# Patient Record
Sex: FEMALE | Race: WHITE
[De-identification: names, ages, dates, MRNs, and addresses within clinical notes are randomized per-mention and may not be internally consistent; named-entity substitution may affect disease eponyms.]

---

## 2019-08-19 ENCOUNTER — NON-APPOINTMENT (OUTPATIENT)
Age: 69
End: 2019-08-19

## 2019-08-19 ENCOUNTER — APPOINTMENT (OUTPATIENT)
Dept: HEART AND VASCULAR | Facility: CLINIC | Age: 69
End: 2019-08-19
Payer: MEDICARE

## 2019-08-19 VITALS — HEIGHT: 63 IN | BODY MASS INDEX: 25.34 KG/M2 | WEIGHT: 143 LBS

## 2019-08-19 VITALS — DIASTOLIC BLOOD PRESSURE: 80 MMHG | SYSTOLIC BLOOD PRESSURE: 130 MMHG

## 2019-08-19 DIAGNOSIS — R09.89 OTHER SPECIFIED SYMPTOMS AND SIGNS INVOLVING THE CIRCULATORY AND RESPIRATORY SYSTEMS: ICD-10-CM

## 2019-08-19 DIAGNOSIS — I20.9 ANGINA PECTORIS, UNSPECIFIED: ICD-10-CM

## 2019-08-19 DIAGNOSIS — J44.9 CHRONIC OBSTRUCTIVE PULMONARY DISEASE, UNSPECIFIED: ICD-10-CM

## 2019-08-19 DIAGNOSIS — Z00.00 ENCOUNTER FOR GENERAL ADULT MEDICAL EXAMINATION W/OUT ABNORMAL FINDINGS: ICD-10-CM

## 2019-08-19 PROCEDURE — 93306 TTE W/DOPPLER COMPLETE: CPT

## 2019-08-19 PROCEDURE — 99204 OFFICE O/P NEW MOD 45 MIN: CPT

## 2019-08-19 PROCEDURE — 93000 ELECTROCARDIOGRAM COMPLETE: CPT

## 2019-08-19 PROCEDURE — 93880 EXTRACRANIAL BILAT STUDY: CPT

## 2019-08-19 RX ORDER — ASPIRIN 81 MG/1
81 TABLET ORAL DAILY
Qty: 90 | Refills: 0 | Status: ACTIVE | COMMUNITY
Start: 2019-08-19 | End: 1900-01-01

## 2019-08-19 RX ORDER — METOPROLOL SUCCINATE 50 MG/1
50 TABLET, EXTENDED RELEASE ORAL
Qty: 90 | Refills: 3 | Status: ACTIVE | COMMUNITY
Start: 2019-08-19 | End: 1900-01-01

## 2019-08-19 RX ORDER — UMECLIDINIUM BROMIDE AND VILANTEROL TRIFENATATE 62.5; 25 UG/1; UG/1
POWDER RESPIRATORY (INHALATION)
Refills: 0 | Status: ACTIVE | COMMUNITY

## 2019-08-19 NOTE — HISTORY OF PRESENT ILLNESS
[FreeTextEntry1] : 69-year-old white female who is an active smoker with COPD who presents with a one-month history of progressive symptoms of marked dyspnea with minimal activity associated with midsternal chest pain lasting 2-3 minutes relieved with rest patient currently is only taking aspirin and uses a inhaler periodically. Patient denies any E. edema syncope palpitations.

## 2019-08-19 NOTE — PHYSICAL EXAM
[General Appearance - Well Developed] : well developed [Normal Appearance] : normal appearance [Well Groomed] : well groomed [General Appearance - Well Nourished] : well nourished [No Deformities] : no deformities [General Appearance - In No Acute Distress] : no acute distress [Normal Conjunctiva] : the conjunctiva exhibited no abnormalities [Eyelids - No Xanthelasma] : the eyelids demonstrated no xanthelasmas [Normal Oral Mucosa] : normal oral mucosa [No Oral Cyanosis] : no oral cyanosis [No Oral Pallor] : no oral pallor [Normal Jugular Venous V Waves Present] : normal jugular venous V waves present [Normal Jugular Venous A Waves Present] : normal jugular venous A waves present [No Jugular Venous James A Waves] : no jugular venous james A waves [II] : a grade 2 [Normal S1] : normal S1 [Normal S2] : normal S2 [Right Carotid Bruit] : right carotid bruit heard [Left Carotid Bruit] : left carotid bruit heard [Exaggerated Use Of Accessory Muscles For Inspiration] : no accessory muscle use [Respiration, Rhythm And Depth] : normal respiratory rhythm and effort [Auscultation Breath Sounds / Voice Sounds] : lungs were clear to auscultation bilaterally [Abdomen Soft] : soft [Abdomen Tenderness] : non-tender [] : no hepato-splenomegaly [Abdomen Mass (___ Cm)] : no abdominal mass palpated [Abnormal Walk] : normal gait [Gait - Sufficient For Exercise Testing] : the gait was sufficient for exercise testing

## 2019-08-19 NOTE — REVIEW OF SYSTEMS
[Dyspnea on exertion] : dyspnea during exertion [Shortness Of Breath] : shortness of breath [Chest Pain] : chest pain [Chest  Pressure] : chest pressure [Abdominal Pain] : abdominal pain [Heartburn] : heartburn [Negative] : Heme/Lymph

## 2019-08-19 NOTE — ASSESSMENT
[FreeTextEntry1] : Recent onset angina at low level of effort \par Add asa and toprol\par will recommend abgio to assess ischemic origin of pain\par Had discussion with daughter about \par angiogram . I have recommended immediate procedure and that patient not  travel to [facility] \par Echo LVEF 55% TDS MILD AI MR TR

## 2019-08-20 LAB
ALBUMIN SERPL ELPH-MCNC: 4.6 G/DL
ALP BLD-CCNC: 105 U/L
ALT SERPL-CCNC: 10 U/L
AST SERPL-CCNC: 12 U/L
BILIRUB DIRECT SERPL-MCNC: 0.1 MG/DL
BILIRUB INDIRECT SERPL-MCNC: 0.2 MG/DL
BILIRUB SERPL-MCNC: 0.4 MG/DL
CHOLEST SERPL-MCNC: 264 MG/DL
CHOLEST/HDLC SERPL: 4.3 RATIO
HDLC SERPL-MCNC: 61 MG/DL
LDLC SERPL CALC-MCNC: 177 MG/DL
PROT SERPL-MCNC: 7 G/DL
TRIGL SERPL-MCNC: 128 MG/DL

## 2019-08-28 RX ORDER — ATORVASTATIN CALCIUM 20 MG/1
20 TABLET, FILM COATED ORAL DAILY
Qty: 90 | Refills: 3 | Status: ACTIVE | COMMUNITY
Start: 2019-08-28 | End: 1900-01-01

## 2019-09-09 ENCOUNTER — APPOINTMENT (OUTPATIENT)
Dept: HEART AND VASCULAR | Facility: CLINIC | Age: 69
End: 2019-09-09

## 2019-09-23 ENCOUNTER — APPOINTMENT (OUTPATIENT)
Dept: HEART AND VASCULAR | Facility: CLINIC | Age: 69
End: 2019-09-23

## 2019-10-01 ENCOUNTER — MOBILE ON CALL (OUTPATIENT)
Age: 69
End: 2019-10-01

## 2019-10-03 ENCOUNTER — APPOINTMENT (OUTPATIENT)
Dept: HEART AND VASCULAR | Facility: CLINIC | Age: 69
End: 2019-10-03

## 2023-05-23 ENCOUNTER — EMERGENCY (EMERGENCY)
Facility: HOSPITAL | Age: 73
LOS: 0 days | Discharge: LEFT BEFORE DISCHARGE | End: 2023-05-23
Attending: EMERGENCY MEDICINE
Payer: MEDICARE

## 2023-05-23 VITALS
HEART RATE: 76 BPM | DIASTOLIC BLOOD PRESSURE: 78 MMHG | OXYGEN SATURATION: 94 % | RESPIRATION RATE: 18 BRPM | WEIGHT: 110.01 LBS | SYSTOLIC BLOOD PRESSURE: 144 MMHG | TEMPERATURE: 97 F

## 2023-05-23 VITALS
RESPIRATION RATE: 20 BRPM | DIASTOLIC BLOOD PRESSURE: 72 MMHG | SYSTOLIC BLOOD PRESSURE: 138 MMHG | HEART RATE: 72 BPM | OXYGEN SATURATION: 94 %

## 2023-05-23 DIAGNOSIS — R05.9 COUGH, UNSPECIFIED: ICD-10-CM

## 2023-05-23 DIAGNOSIS — J44.9 CHRONIC OBSTRUCTIVE PULMONARY DISEASE, UNSPECIFIED: ICD-10-CM

## 2023-05-23 DIAGNOSIS — R06.2 WHEEZING: ICD-10-CM

## 2023-05-23 DIAGNOSIS — Z53.29 PROCEDURE AND TREATMENT NOT CARRIED OUT BECAUSE OF PATIENT'S DECISION FOR OTHER REASONS: ICD-10-CM

## 2023-05-23 DIAGNOSIS — I49.1 ATRIAL PREMATURE DEPOLARIZATION: ICD-10-CM

## 2023-05-23 DIAGNOSIS — E78.5 HYPERLIPIDEMIA, UNSPECIFIED: ICD-10-CM

## 2023-05-23 DIAGNOSIS — Z88.0 ALLERGY STATUS TO PENICILLIN: ICD-10-CM

## 2023-05-23 DIAGNOSIS — I10 ESSENTIAL (PRIMARY) HYPERTENSION: ICD-10-CM

## 2023-05-23 DIAGNOSIS — R07.89 OTHER CHEST PAIN: ICD-10-CM

## 2023-05-23 DIAGNOSIS — R06.02 SHORTNESS OF BREATH: ICD-10-CM

## 2023-05-23 DIAGNOSIS — F17.200 NICOTINE DEPENDENCE, UNSPECIFIED, UNCOMPLICATED: ICD-10-CM

## 2023-05-23 LAB
ALBUMIN SERPL ELPH-MCNC: 4.3 G/DL — SIGNIFICANT CHANGE UP (ref 3.5–5.2)
ALP SERPL-CCNC: 114 U/L — SIGNIFICANT CHANGE UP (ref 30–115)
ALT FLD-CCNC: 7 U/L — SIGNIFICANT CHANGE UP (ref 0–41)
ANION GAP SERPL CALC-SCNC: 16 MMOL/L — HIGH (ref 7–14)
AST SERPL-CCNC: 13 U/L — SIGNIFICANT CHANGE UP (ref 0–41)
BASE EXCESS BLDV CALC-SCNC: 2.4 MMOL/L — SIGNIFICANT CHANGE UP (ref -2–3)
BASOPHILS # BLD AUTO: 0.03 K/UL — SIGNIFICANT CHANGE UP (ref 0–0.2)
BASOPHILS NFR BLD AUTO: 0.3 % — SIGNIFICANT CHANGE UP (ref 0–1)
BILIRUB SERPL-MCNC: 0.3 MG/DL — SIGNIFICANT CHANGE UP (ref 0.2–1.2)
BUN SERPL-MCNC: 12 MG/DL — SIGNIFICANT CHANGE UP (ref 10–20)
CA-I SERPL-SCNC: 1.22 MMOL/L — SIGNIFICANT CHANGE UP (ref 1.15–1.33)
CALCIUM SERPL-MCNC: 9.6 MG/DL — SIGNIFICANT CHANGE UP (ref 8.4–10.5)
CHLORIDE SERPL-SCNC: 104 MMOL/L — SIGNIFICANT CHANGE UP (ref 98–110)
CO2 SERPL-SCNC: 22 MMOL/L — SIGNIFICANT CHANGE UP (ref 17–32)
CREAT SERPL-MCNC: 0.7 MG/DL — SIGNIFICANT CHANGE UP (ref 0.7–1.5)
EGFR: 91 ML/MIN/1.73M2 — SIGNIFICANT CHANGE UP
EOSINOPHIL # BLD AUTO: 0.07 K/UL — SIGNIFICANT CHANGE UP (ref 0–0.7)
EOSINOPHIL NFR BLD AUTO: 0.8 % — SIGNIFICANT CHANGE UP (ref 0–8)
GAS PNL BLDV: 137 MMOL/L — SIGNIFICANT CHANGE UP (ref 136–145)
GAS PNL BLDV: SIGNIFICANT CHANGE UP
GLUCOSE SERPL-MCNC: 115 MG/DL — HIGH (ref 70–99)
HCO3 BLDV-SCNC: 28 MMOL/L — SIGNIFICANT CHANGE UP (ref 22–29)
HCT VFR BLD CALC: 44.6 % — SIGNIFICANT CHANGE UP (ref 37–47)
HCT VFR BLDA CALC: 43 % — SIGNIFICANT CHANGE UP (ref 39–51)
HGB BLD-MCNC: 14.4 G/DL — SIGNIFICANT CHANGE UP (ref 12–16)
IMM GRANULOCYTES NFR BLD AUTO: 0.3 % — SIGNIFICANT CHANGE UP (ref 0.1–0.3)
LACTATE BLDV-MCNC: 2.6 MMOL/L — HIGH (ref 0.5–2)
LYMPHOCYTES # BLD AUTO: 1.79 K/UL — SIGNIFICANT CHANGE UP (ref 1.2–3.4)
LYMPHOCYTES # BLD AUTO: 20.8 % — SIGNIFICANT CHANGE UP (ref 20.5–51.1)
MCHC RBC-ENTMCNC: 26.5 PG — LOW (ref 27–31)
MCHC RBC-ENTMCNC: 32.3 G/DL — SIGNIFICANT CHANGE UP (ref 32–37)
MCV RBC AUTO: 82 FL — SIGNIFICANT CHANGE UP (ref 81–99)
MONOCYTES # BLD AUTO: 0.41 K/UL — SIGNIFICANT CHANGE UP (ref 0.1–0.6)
MONOCYTES NFR BLD AUTO: 4.8 % — SIGNIFICANT CHANGE UP (ref 1.7–9.3)
NEUTROPHILS # BLD AUTO: 6.29 K/UL — SIGNIFICANT CHANGE UP (ref 1.4–6.5)
NEUTROPHILS NFR BLD AUTO: 73 % — SIGNIFICANT CHANGE UP (ref 42.2–75.2)
NRBC # BLD: 0 /100 WBCS — SIGNIFICANT CHANGE UP (ref 0–0)
PCO2 BLDV: 48 MMHG — HIGH (ref 39–42)
PH BLDV: 7.38 — SIGNIFICANT CHANGE UP (ref 7.32–7.43)
PLATELET # BLD AUTO: 255 K/UL — SIGNIFICANT CHANGE UP (ref 130–400)
PMV BLD: 10.1 FL — SIGNIFICANT CHANGE UP (ref 7.4–10.4)
PO2 BLDV: 25 MMHG — SIGNIFICANT CHANGE UP
POTASSIUM BLDV-SCNC: 3.5 MMOL/L — SIGNIFICANT CHANGE UP (ref 3.5–5.1)
POTASSIUM SERPL-MCNC: 4.2 MMOL/L — SIGNIFICANT CHANGE UP (ref 3.5–5)
POTASSIUM SERPL-SCNC: 4.2 MMOL/L — SIGNIFICANT CHANGE UP (ref 3.5–5)
PROT SERPL-MCNC: 6.6 G/DL — SIGNIFICANT CHANGE UP (ref 6–8)
RBC # BLD: 5.44 M/UL — HIGH (ref 4.2–5.4)
RBC # FLD: 14.2 % — SIGNIFICANT CHANGE UP (ref 11.5–14.5)
SODIUM SERPL-SCNC: 142 MMOL/L — SIGNIFICANT CHANGE UP (ref 135–146)
TROPONIN T SERPL-MCNC: <0.01 NG/ML — SIGNIFICANT CHANGE UP
WBC # BLD: 8.62 K/UL — SIGNIFICANT CHANGE UP (ref 4.8–10.8)
WBC # FLD AUTO: 8.62 K/UL — SIGNIFICANT CHANGE UP (ref 4.8–10.8)

## 2023-05-23 PROCEDURE — 71045 X-RAY EXAM CHEST 1 VIEW: CPT

## 2023-05-23 PROCEDURE — 84295 ASSAY OF SERUM SODIUM: CPT

## 2023-05-23 PROCEDURE — 85018 HEMOGLOBIN: CPT

## 2023-05-23 PROCEDURE — 85025 COMPLETE CBC W/AUTO DIFF WBC: CPT

## 2023-05-23 PROCEDURE — 82803 BLOOD GASES ANY COMBINATION: CPT

## 2023-05-23 PROCEDURE — 82330 ASSAY OF CALCIUM: CPT

## 2023-05-23 PROCEDURE — 36415 COLL VENOUS BLD VENIPUNCTURE: CPT

## 2023-05-23 PROCEDURE — 99285 EMERGENCY DEPT VISIT HI MDM: CPT | Mod: 25

## 2023-05-23 PROCEDURE — 84132 ASSAY OF SERUM POTASSIUM: CPT

## 2023-05-23 PROCEDURE — 83605 ASSAY OF LACTIC ACID: CPT

## 2023-05-23 PROCEDURE — 94640 AIRWAY INHALATION TREATMENT: CPT

## 2023-05-23 PROCEDURE — 84484 ASSAY OF TROPONIN QUANT: CPT

## 2023-05-23 PROCEDURE — 85014 HEMATOCRIT: CPT

## 2023-05-23 PROCEDURE — 93005 ELECTROCARDIOGRAM TRACING: CPT

## 2023-05-23 PROCEDURE — 99285 EMERGENCY DEPT VISIT HI MDM: CPT

## 2023-05-23 PROCEDURE — 71045 X-RAY EXAM CHEST 1 VIEW: CPT | Mod: 26

## 2023-05-23 PROCEDURE — 93010 ELECTROCARDIOGRAM REPORT: CPT

## 2023-05-23 PROCEDURE — 80053 COMPREHEN METABOLIC PANEL: CPT

## 2023-05-23 PROCEDURE — 96374 THER/PROPH/DIAG INJ IV PUSH: CPT

## 2023-05-23 RX ORDER — MAGNESIUM SULFATE 500 MG/ML
2 VIAL (ML) INJECTION ONCE
Refills: 0 | Status: COMPLETED | OUTPATIENT
Start: 2023-05-23 | End: 2023-05-23

## 2023-05-23 RX ORDER — IPRATROPIUM/ALBUTEROL SULFATE 18-103MCG
3 AEROSOL WITH ADAPTER (GRAM) INHALATION
Refills: 0 | Status: COMPLETED | OUTPATIENT
Start: 2023-05-23 | End: 2023-05-23

## 2023-05-23 RX ORDER — SODIUM CHLORIDE 9 MG/ML
1000 INJECTION, SOLUTION INTRAVENOUS ONCE
Refills: 0 | Status: COMPLETED | OUTPATIENT
Start: 2023-05-23 | End: 2023-05-23

## 2023-05-23 RX ADMIN — Medication 150 GRAM(S): at 15:05

## 2023-05-23 RX ADMIN — Medication 3 MILLILITER(S): at 14:04

## 2023-05-23 RX ADMIN — Medication 3 MILLILITER(S): at 13:50

## 2023-05-23 RX ADMIN — Medication 3 MILLILITER(S): at 13:30

## 2023-05-23 RX ADMIN — Medication 60 MILLIGRAM(S): at 13:42

## 2023-05-23 NOTE — ED ADULT NURSE NOTE - ISOLATION TYPE:
Pt arrived via [facility] from home with co chest pain times two hours. Pt was given 325 asa by ems. Pt states his chest pain has resolved.
None

## 2023-05-23 NOTE — ED PROVIDER NOTE - CLINICAL SUMMARY MEDICAL DECISION MAKING FREE TEXT BOX
72-year-old female active smoker with a history of COPD never admitted or seen in the ER not known to a pulmonologist presents with worsening shortness of breath and cough, has chest tightness, no fever, no leg pain or swelling, on exam vitals appreciated, nontoxic-appearing but with prolonged expirations and poor air entry with fine end expiratory wheeze, cor regular, abdomen soft nontender, calves nontender, no clubbing cyanosis or edema, neurologically intact, given nebs and steroids with subjective improvement however is very dyspneic on exertion however refuses admission, demonstrates capacity, will sign out against advice, will discharge on albuterol and steroids to quit smoking and follow-up with pulmonologist.  Patient and daughter counseled regarding conditions which should prompt return

## 2023-05-23 NOTE — ED PROVIDER NOTE - PHYSICAL EXAMINATION
CONSTITUTIONAL: Well-developed; well-nourished; NAD  SKIN: warm, dry, w/o rash  HEAD: NCAT  EYES: PERRLA, EOMI, no conjunctival injection  ENT: No nasal discharge; nl OP without erythema or exudates  NECK: Supple, non-tender  CARD: nl S1, S2; RRR, no MRG, no JVD  RESP: diffuse wheezing, normal respiratory effort  ABD: BS+, soft, NTND, no HSM  EXT: Normal ROM.  No clubbing, cyanosis or edema  NEURO: Alert, oriented, grossly unremarkable  PSYCH: Cooperative, appropriate

## 2023-05-23 NOTE — ED PROVIDER NOTE - NSPTACCESSSVCSAPPTDETAILS_ED_ALL_ED_FT
Patient presenting to ED with COPD exacerbation, no longer has pulmonologist, requesting pulmonologist in [facility] Patient presenting to ED with COPD exacerbation, no longer has pulmonologist, requesting pulmonologist in [facility] pl[facility] for COPD exacerbation -CD

## 2023-05-23 NOTE — ED PROVIDER NOTE - OBJECTIVE STATEMENT
She has her child patient is a 72-year-old female with past medical history of COPD not on home O2, current tobacco smoker, hypertension, hyperlipidemia presenting with 1 day of increased shortness of breath, wheezing, chest tightness.  Endorses mild cough and sputum production which is unchanged.  Denies fever, chills.  Denies chest pain.  Endorses tightness.  Denies lower extremity swelling, pain, personal or family history of blood clots.  Patient says symptoms have been mildly improved each time she uses her Anbro inhaler at home.  Patient otherwise well

## 2023-05-23 NOTE — ED PROVIDER NOTE - PATIENT PORTAL LINK FT
You can access the FollowMyHealth Patient Portal offered by [facility] by registering at the following website: [email or web address]. By joining [employer]’s FollowMyHealth portal, you will also be able to view your health information using other applications (apps) compatible with our system.

## 2023-05-23 NOTE — ED PROVIDER NOTE - WR ORDER ID 1
Pre-Conscious Sedation procedure phone call made to patient with the following instructions:    1. Arrive one hour (specify arrival time) before your scheduled appointment time. Please disregard the time given on the automated appointment reminder phone call.    2. Nothing to eat or drink 8 hours prior to your scheduled appointment time. You may take essential medications with sips of water.    3. You will need someone to drive you home.  Your  will need to accompany you into the Pain Clinic for your appointment in order to be checked-in for your procedure.  Your  is also required to stay in the Pain Clinic during your procedure and until discharge.  If your  is not physically in clinic, the doctor has the option to cancel your sedation, or have you reschedule when a  is available.  It is strongly recommended that someone stay with you for the day.    - Patient had no questions about  requirements and verbalized understanding of  requirements (Yes / No)   - Patient had questions about  requirements.  Questions were answered and patient verbalized understanding about  requirements (Yes / No) :     Name of  :  Message left     Contact Number:       4. If there are any questions regarding diabetic medications please call your primary doctor.    5. If you are on blood thinners: did you stop taking them as instructed?      As a reminder, with having conscious sedation for your short procedure, the goal is to reduce pain, anxiety, and stressful memories of the procedure.  Conscious sedation can also help your healthcare provider to treat you and help make you comfortable.  These medications may make you sleepy, but arousable to answer questions during the procedure.  This is not the same as General Anesthesia, where you are put in a deep sleep for surgery and may stay overnight in the hospital.  
[id number]

## 2023-05-23 NOTE — ED ADULT NURSE NOTE - HOW OFTEN DO YOU HAVE SIX OR MORE DRINKS ON ONE OCCASION?
Never You can access the FollowMyHealth Patient Portal offered by [facility] by registering at the following website: [email or web address]. By joining [employer]’s FollowMyHealth portal, you will also be able to view your health information using other applications (apps) compatible with our system.

## 2023-05-23 NOTE — ED PROVIDER NOTE - NSFOLLOWUPINSTRUCTIONS_ED_ALL_ED_FT
Our Emergency Department Referral Coordinators will be reaching out to you in the next 24-48 hours from 9:00am to 5:00pm with a follow up appointment. Please expect a phone call from the hospital in that time frame. If you do not receive a call or if you have any questions or concerns, you can reach them at   [phone]      Chronic Obstructive Pulmonary Disease     Chronic obstructive pulmonary disease (COPD) is a common lung condition in which airflow from the lungs is limited. Causes include smoking, secondhand smoke exposure, genetics, or recurrent infections. Symptoms include shortness of breath, coughing, wheezing, rapid breathing, fatigue, chest tightness, or frequent infections. Take all medicines (inhaled or pills) as directed by your health care provider. Avoid exposure to irritants such as smoke, chemicals, and fumes that aggravate your breathing.    If you are a smoker, the most important thing that you can do is stop smoking. Continuing to smoke will cause further lung damage and breathing trouble. Ask your health care provider for help with quitting smoking. He or she can direct you to community resources or hospitals that provide support.    SEEK IMMEDIATE MEDICAL CARE IF YOU HAVE THE FOLLOWING SYMPTOMS: shortness of breath at rest or when talking, bluish discoloration of lips, skin, fever, worsening cough, unexplained chest pain, or lightheadedness/dizziness.

## 2023-05-23 NOTE — ED PROVIDER NOTE - PROGRESS NOTE DETAILS
Patient states she is feeling much better, improved air movement, says chest tightness has resolved, decreased wheezing, patient marched in place for over 1 minutes with pulse oximeter in place, oxygen did not drop lower than 91%, will DC with strict return precautions, rapid pulmonary follow-up, steroids sent to pharmacy -CD Patient states she is feeling much better, requesting to leave, says chest tightness has resolved. On exam, decreased wheezing, but PT remains with increased WOB upon ambulation; recommend admission for further management; PT requesting to leave AMA despite repeated attempts by provider to convince PT to stay; PT verbilized understanding of risks including death, still requesting to leave -CD

## 2023-05-23 NOTE — ED ADULT NURSE NOTE - NSFALLHARMRISKINTERV_ED_ALL_ED

## 2023-06-06 ENCOUNTER — APPOINTMENT (OUTPATIENT)
Dept: PULMONOLOGY | Facility: CLINIC | Age: 73
End: 2023-06-06

## 2023-08-03 ENCOUNTER — APPOINTMENT (OUTPATIENT)
Dept: NEUROLOGY | Facility: CLINIC | Age: 73
End: 2023-08-03
Payer: MEDICARE

## 2023-08-03 VITALS
HEART RATE: 115 BPM | WEIGHT: 135 LBS | DIASTOLIC BLOOD PRESSURE: 91 MMHG | HEIGHT: 64 IN | SYSTOLIC BLOOD PRESSURE: 148 MMHG | BODY MASS INDEX: 23.05 KG/M2

## 2023-08-03 DIAGNOSIS — Z82.49 FAMILY HISTORY OF ISCHEMIC HEART DISEASE AND OTHER DISEASES OF THE CIRCULATORY SYSTEM: ICD-10-CM

## 2023-08-03 DIAGNOSIS — Z86.39 PERSONAL HISTORY OF OTHER ENDOCRINE, NUTRITIONAL AND METABOLIC DISEASE: ICD-10-CM

## 2023-08-03 DIAGNOSIS — Z81.8 FAMILY HISTORY OF OTHER MENTAL AND BEHAVIORAL DISORDERS: ICD-10-CM

## 2023-08-03 DIAGNOSIS — Z83.3 FAMILY HISTORY OF DIABETES MELLITUS: ICD-10-CM

## 2023-08-03 DIAGNOSIS — Z86.79 PERSONAL HISTORY OF OTHER DISEASES OF THE CIRCULATORY SYSTEM: ICD-10-CM

## 2023-08-03 PROCEDURE — 99203 OFFICE O/P NEW LOW 30 MIN: CPT

## 2023-08-03 RX ORDER — ESOMEPRAZOLE MAGNESIUM 10 MG/1
10 GRANULE, DELAYED RELEASE ORAL
Refills: 0 | Status: ACTIVE | COMMUNITY

## 2023-08-03 RX ORDER — ATORVASTATIN CALCIUM 10 MG/1
10 TABLET, FILM COATED ORAL
Refills: 0 | Status: ACTIVE | COMMUNITY

## 2023-08-03 RX ORDER — LISINOPRIL 10 MG/1
10 TABLET ORAL
Refills: 0 | Status: ACTIVE | COMMUNITY

## 2023-08-03 NOTE — HISTORY OF PRESENT ILLNESS
[FreeTextEntry1] : Ms. Genao is a 73-year-old woman who presents today in neurologic consultation accompanied by her daughter. Since the [employer] of Covid in 2020, patient has had increasing severity of memory loss and forgetfulness. This worsened over the past 6 months. She has forgotten how to cook and forgets what she's looking for in the house. She forgets what she's been told and repeats herself. Patient saw a neurologist in 2020 and she had a MMSE and MRI of the brain. Apparently, the results at that time were much better than today's evaluation according to her daughter. The patient was not told she had dementia at the time at her last neurologic evaluation, but we do not have those reports.

## 2023-08-03 NOTE — ASSESSMENT
[FreeTextEntry1] : Impression is that of dementia. The type of dementia is uncertain. It may be a multi-infarct type with her hypertension and elevated cholesterol. I ordered an MRI of the brain to r/o a vascular origin. I also asked for the reports from her work up in 2020 for my review for comparison, and the daughter will look for them. She did say her mom did much better then than today. We will see the patient back in follow up when imaging is complete.  Total clinician time spent today on the patient is 35 minutes including preparing to see the patient, obtaining and/or reviewing and confirming history, performing medically necessary and appropriate examination, counseling and educating the patient and/or family, documenting clinical information in the EHR and communicating and/or referring to other healthcare professionals.  Entered by Corinna Perez acting as scribe for Dr. Valladares.   The documentation recorded by the scribe, in my presence, accurately reflects the service I personally performed, and the decisions made by me with my edits as appropriate.  Santos Valladares MD, FAAN, FACP Diplomate American Board of Psychiatry & Neurology

## 2023-08-03 NOTE — REVIEW OF SYSTEMS
[Memory Lapses or Loss] : memory loss [Changed Thought Patterns] : changed thought patterns [Repeating Questions] : repeated questioning about recent events

## 2023-08-03 NOTE — PHYSICAL EXAM
[FreeTextEntry1] : PHYSICAL EXAMINATION: Head: Normocephalic, atraumatic. Negative TA tenderness/prominence.   Neck: Supple with full range of motion; nontender with negative bilateral Spurling's signs.   Spine: Full range of motion; nontender. Negative straight leg raise maneuvers.   Extremities: Non-tender. Atraumatic. Negative Tinel's signs.   NEUROLOGICAL EXAMINATION:   Mental Status: Patient is not able to complete the tasks. She does not speak English well and daughter had to interpret into [place]. Patient could not tell me the year, date, day, or the month. She could not tell me the city we were in or the state. She was able to tell me the floor we were on. She could not remember 3 objects after 10 minutes. Patient could not spell "world" forward or backward. She was not able to follow a 3 stage command even when giving her the instructions in [place]. We did not finish due to lack of function she demonstrated.   Cranial Nerves Cranial Nerves:   II, III, IV, VI: Pupils are equal, round, and reactive to light and accommodation. No evidence of afferent pupillary defect. Visual fields are full to confrontation. Eye movements are full without evidence of nystagmus or internuclear ophthalmoplegia. Funduscopic examination reveals sharp disc margins.   V: Normal jaw movements. Normal facial sensation.   VII: Normal facial motor testing.   VIII: Grossly normal hearing bilaterally.   IX, X: Palate moves symmetrically. No dysarthria.   XI: Normal shoulder shrug and sternocleidomastoid power.   XII: Tongue appears normal and protrudes in the midline.   Motor: Normal bulk, tone, and power throughout for age.   Muscle Stretch Reflexes (right/left): 2+ symmetrical.   Plantar Responses: Flexor bilaterally.   Coordination: Normal finger to nose and heel to shin testing, no truncal ataxia and no tremor.   Sensation: Normal primary sensation. Normal double simultaneous stimulation.   Gait and Station: Patient ambulates independently. She did not need to use a cane. Patient's posture is stooped and kyphotic with tendency to shuffle.

## 2023-08-18 ENCOUNTER — APPOINTMENT (OUTPATIENT)
Dept: MRI IMAGING | Facility: CLINIC | Age: 73
End: 2023-08-18
Payer: MEDICARE

## 2023-08-18 PROCEDURE — 70551 MRI BRAIN STEM W/O DYE: CPT

## 2023-08-31 ENCOUNTER — APPOINTMENT (OUTPATIENT)
Dept: NEUROLOGY | Facility: CLINIC | Age: 73
End: 2023-08-31
Payer: MEDICARE

## 2023-08-31 DIAGNOSIS — F03.90 UNSPECIFIED DEMENTIA W/OUT BEHAVIORAL DISTURBANCE: ICD-10-CM

## 2023-08-31 DIAGNOSIS — F17.200 NICOTINE DEPENDENCE, UNSPECIFIED, UNCOMPLICATED: ICD-10-CM

## 2023-08-31 DIAGNOSIS — G93.89 OTHER SPECIFIED DISORDERS OF BRAIN: ICD-10-CM

## 2023-08-31 DIAGNOSIS — G31.9 DEGENERATIVE DISEASE OF NERVOUS SYSTEM, UNSPECIFIED: ICD-10-CM

## 2023-08-31 DIAGNOSIS — I69.319 UNSPECIFIED SYMPTOMS AND SIGNS INVOLVING COGNITIVE FUNCTIONS FOLLOWING CEREBRAL INFARCTION: ICD-10-CM

## 2023-08-31 PROCEDURE — 99215 OFFICE O/P EST HI 40 MIN: CPT

## 2023-08-31 NOTE — ASSESSMENT
[FreeTextEntry1] : 73 year old female under our care for cerebrovascular disease, chronic ischemic infarcts, and dementia. Today we reviewed her MRI of the brain and discussed symptoms of NPH. Patient and daughter agreed to f/u with her cardiologist given her hx of ischemic stroke, smoking status and comorbidities. Patient and family do not wish to initiate medical treatment for her dementia and would like to follow up in 4-6 months. Patient and family are aware that they can call contact the office at any time if they have any additional questions or concerns regarding her management.   I have personally reviewed with the PA, this patients history and physical exam findings, as documented above. I have discussed the relevant areas of concern, having direct implications to the presenting problems and illnesses, and have personally examined all pertinent findings which impact on the prior neurological treatment.  Cintia Perdomo MS, PAFRANCOISE Valladares MD

## 2023-08-31 NOTE — REASON FOR VISIT
[Follow-Up: _____] : a [unfilled] follow-up visit [Initial Evaluation] : an initial evaluation [FreeTextEntry1] : Memory loss

## 2023-08-31 NOTE — PHYSICAL EXAM
[FreeTextEntry1] : PHYSICAL EXAMINATION: Head: Normocephalic, atraumatic. Negative TA tenderness/prominence.   Neck: Supple with full range of motion; nontender with negative bilateral Spurling's signs.   Spine: Full range of motion; nontender. Negative straight leg raise maneuvers.   Extremities: Non-tender. Atraumatic. Negative Tinel's signs.   NEUROLOGICAL EXAMINATION:   Mental Status: Patient is not able to complete the tasks. She does not speak English well and daughter had to interpret into [place]. Patient could not tell me the year, date, day, or the month. She could not tell me the city we were in or the state. She was able to tell me the floor we were on. She could not remember 3 objects after 10 minutes. Patient could not spell "world" forward or backward. She was not able to follow a 3 stage command even when giving her the instructions in [place]. We did not finish due to lack of function she demonstrated.   Cranial Nerves Cranial Nerves:   II, III, IV, VI: Pupils are equal, round, and reactive to light and accommodation. No evidence of afferent pupillary defect. Visual fields are full to confrontation. Eye movements are full without evidence of nystagmus or internuclear ophthalmoplegia. Funduscopic examination reveals sharp disc margins.   V: Normal jaw movements. Normal facial sensation.   VII: Normal facial motor testing.   VIII: Grossly normal hearing bilaterally.   IX, X: Palate moves symmetrically. No dysarthria.   XI: Normal shoulder shrug and sternocleidomastoid power.   XII: Tongue appears normal and protrudes in the midline.   Motor: Normal bulk, tone, and power throughout for age.   Muscle Stretch Reflexes (right/left): 2+ symmetrical.   Plantar Responses: Flexor bilaterally.   Coordination: Normal finger to nose and heel to shin testing, no truncal ataxia and no tremor.   Sensation: Normal primary sensation. Normal double simultaneous stimulation.   Gait and Station: Patient ambulates independently. She did not need to use a cane. Patient's posture is stooped and kyphotic with tendency to shuffle. [General Appearance - In No Acute Distress] : in no acute distress [Person] : oriented to person [Place] : disoriented to place [Time] : disoriented to time [Short Term Intact] : short term memory impaired [Remote Intact] : remote memory impaired [Concentration Intact] : a decrease in concentrating ability was observed [Visual Intact] : visual attention was ~T not ~L decreased [Naming Objects] : no difficulty naming common objects [Repeating Phrases] : no difficulty repeating a phrase [Writing A Sentence] : no difficulty writing a sentence [Fluency] : fluency intact [Comprehension] : comprehension not intact [Reading] : reading intact [Current Events] : inadequate knowledge of current events [Past History] : adequate knowledge of personal past history [Cranial Nerves Optic (II)] : visual acuity intact bilaterally,  visual fields full to confrontation, pupils equal round and reactive to light [Cranial Nerves Oculomotor (III)] : extraocular motion intact [Cranial Nerves Trigeminal (V)] : facial sensation intact symmetrically [Cranial Nerves Facial (VII)] : face symmetrical [Cranial Nerves Vestibulocochlear (VIII)] : hearing was intact bilaterally [Cranial Nerves Glossopharyngeal (IX)] : tongue and palate midline [Cranial Nerves Accessory (XI - Cranial And Spinal)] : head turning and shoulder shrug symmetric [Cranial Nerves Hypoglossal (XII)] : there was no tongue deviation with protrusion [Motor Tone] : muscle tone was normal in all four extremities [Motor Strength] : muscle strength was normal in all four extremities [No Muscle Atrophy] : normal bulk in all four extremities [Motor Strength Upper Extremities Bilaterally] : strength was normal in both upper extremities [Motor Strength Lower Extremities Bilaterally] : strength was normal in both lower extremities [Sensation Tactile Decrease] : light touch was intact [Balance] : balance was intact [Past-pointing] : there was no past-pointing [Tremor] : no tremor present [2+] : Ankle jerk left 2+ [Plantar Reflex Right Only] : normal on the right [Plantar Reflex Left Only] : normal on the left [Extraocular Movements] : extraocular movements were intact [Hearing Threshold Finger Rub Not [place]] : hearing was normal [Neck Appearance] : the appearance of the neck was normal [No Spinal Tenderness] : no spinal tenderness [Abnormal Walk] : normal gait

## 2023-08-31 NOTE — HISTORY OF PRESENT ILLNESS
[FreeTextEntry1] : Ms. Genao is a 73-year-old woman who presents today in neurologic consultation accompanied by her daughter. Since the [employer] of Covid in 2020, patient has had increasing severity of memory loss and forgetfulness. This worsened over the past 6 months. She has forgotten how to cook and forgets what she's looking for in the house. She forgets what she's been told and repeats herself. Patient saw a neurologist in 2020 and she had a MMSE and MRI of the brain. Apparently, the results at that time were much better than today's evaluation according to her daughter. The patient was not told she had dementia at the time at her last neurologic evaluation, but we do not have those reports.   Today : Today I had the pleasure of seeing Ms. Genao accompanied by her daughter and grandchildren  in our office for follow up.  The patients previous history and physical findings have been reviewed.  Ms. Genao remains under our care for cerebrovascular disease, chronic ischemic infarcts, and dementia. Today we reviewed her MRI of the brain noted above and discussed mixed-type dementia. Ms Genao is also diagnosed with COPD, is a current smoker, and was told by her cardiologist that she has "non operable heart blockages". I advised smoking cessation and close f/u with her cardiologist given her history of CVA. Patient daughter states she was once prescribed medication for her dementia by her prior neurologist but does not wish to re-start treatment. Regarding the potential NPH noted on MRI, patient daughter denies ataxia or frequent falls and denies urinary incontinence or retention. On exam Ms. Genao is Aox1 not oriented to year, place or situation and is highly dependent on her daughter for daily activities.

## 2023-10-12 PROBLEM — J44.1 CHRONIC OBSTRUCTIVE PULMONARY DISEASE WITH (ACUTE) EXACERBATION: Chronic | Status: ACTIVE | Noted: 2023-05-23

## 2024-02-15 ENCOUNTER — APPOINTMENT (OUTPATIENT)
Dept: NEUROLOGY | Facility: CLINIC | Age: 74
End: 2024-02-15

## 2024-02-16 ENCOUNTER — APPOINTMENT (OUTPATIENT)
Dept: NEUROLOGY | Facility: CLINIC | Age: 74
End: 2024-02-16

## 2024-04-29 ENCOUNTER — INPATIENT (INPATIENT)
Facility: HOSPITAL | Age: 74
LOS: 1 days | Discharge: HOME HEALTH | DRG: 192 | End: 2024-05-01
Attending: INTERNAL MEDICINE | Admitting: INTERNAL MEDICINE
Payer: MEDICARE

## 2024-04-29 VITALS
OXYGEN SATURATION: 96 % | DIASTOLIC BLOOD PRESSURE: 103 MMHG | RESPIRATION RATE: 22 BRPM | HEART RATE: 134 BPM | SYSTOLIC BLOOD PRESSURE: 175 MMHG

## 2024-04-29 LAB
ALBUMIN SERPL ELPH-MCNC: 4 G/DL — SIGNIFICANT CHANGE UP (ref 3.5–5.2)
ALP SERPL-CCNC: 110 U/L — SIGNIFICANT CHANGE UP (ref 30–115)
ALT FLD-CCNC: 6 U/L — SIGNIFICANT CHANGE UP (ref 0–41)
ANION GAP SERPL CALC-SCNC: 9 MMOL/L — SIGNIFICANT CHANGE UP (ref 7–14)
AST SERPL-CCNC: 10 U/L — SIGNIFICANT CHANGE UP (ref 0–41)
BASE EXCESS BLDV CALC-SCNC: 4.8 MMOL/L — HIGH (ref -2–3)
BASOPHILS # BLD AUTO: 0.03 K/UL — SIGNIFICANT CHANGE UP (ref 0–0.2)
BASOPHILS NFR BLD AUTO: 0.2 % — SIGNIFICANT CHANGE UP (ref 0–1)
BILIRUB SERPL-MCNC: 0.3 MG/DL — SIGNIFICANT CHANGE UP (ref 0.2–1.2)
BUN SERPL-MCNC: 21 MG/DL — HIGH (ref 10–20)
CALCIUM SERPL-MCNC: 9.3 MG/DL — SIGNIFICANT CHANGE UP (ref 8.4–10.5)
CHLORIDE SERPL-SCNC: 104 MMOL/L — SIGNIFICANT CHANGE UP (ref 98–110)
CO2 SERPL-SCNC: 28 MMOL/L — SIGNIFICANT CHANGE UP (ref 17–32)
CREAT SERPL-MCNC: 0.7 MG/DL — SIGNIFICANT CHANGE UP (ref 0.7–1.5)
D DIMER BLD IA.RAPID-MCNC: 195 NG/ML DDU — SIGNIFICANT CHANGE UP
EGFR: 90 ML/MIN/1.73M2 — SIGNIFICANT CHANGE UP
EOSINOPHIL # BLD AUTO: 0.12 K/UL — SIGNIFICANT CHANGE UP (ref 0–0.7)
EOSINOPHIL NFR BLD AUTO: 0.9 % — SIGNIFICANT CHANGE UP (ref 0–8)
GLUCOSE SERPL-MCNC: 179 MG/DL — HIGH (ref 70–99)
HCO3 BLDV-SCNC: 33 MMOL/L — HIGH (ref 22–29)
HCT VFR BLD CALC: 42.4 % — SIGNIFICANT CHANGE UP (ref 37–47)
HGB BLD-MCNC: 13.8 G/DL — SIGNIFICANT CHANGE UP (ref 12–16)
IMM GRANULOCYTES NFR BLD AUTO: 0.4 % — HIGH (ref 0.1–0.3)
LYMPHOCYTES # BLD AUTO: 1.47 K/UL — SIGNIFICANT CHANGE UP (ref 1.2–3.4)
LYMPHOCYTES # BLD AUTO: 11.1 % — LOW (ref 20.5–51.1)
MAGNESIUM SERPL-MCNC: 1.7 MG/DL — LOW (ref 1.8–2.4)
MCHC RBC-ENTMCNC: 26.8 PG — LOW (ref 27–31)
MCHC RBC-ENTMCNC: 32.5 G/DL — SIGNIFICANT CHANGE UP (ref 32–37)
MCV RBC AUTO: 82.3 FL — SIGNIFICANT CHANGE UP (ref 81–99)
MONOCYTES # BLD AUTO: 0.63 K/UL — HIGH (ref 0.1–0.6)
MONOCYTES NFR BLD AUTO: 4.7 % — SIGNIFICANT CHANGE UP (ref 1.7–9.3)
NEUTROPHILS # BLD AUTO: 10.98 K/UL — HIGH (ref 1.4–6.5)
NEUTROPHILS NFR BLD AUTO: 82.7 % — HIGH (ref 42.2–75.2)
NRBC # BLD: 0 /100 WBCS — SIGNIFICANT CHANGE UP (ref 0–0)
NT-PROBNP SERPL-SCNC: 201 PG/ML — SIGNIFICANT CHANGE UP (ref 0–300)
PCO2 BLDV: 66 MMHG — HIGH (ref 39–42)
PH BLDV: 7.31 — LOW (ref 7.32–7.43)
PLATELET # BLD AUTO: 288 K/UL — SIGNIFICANT CHANGE UP (ref 130–400)
PMV BLD: 10.1 FL — SIGNIFICANT CHANGE UP (ref 7.4–10.4)
PO2 BLDV: 29 MMHG — SIGNIFICANT CHANGE UP (ref 25–45)
POTASSIUM SERPL-MCNC: 4 MMOL/L — SIGNIFICANT CHANGE UP (ref 3.5–5)
POTASSIUM SERPL-SCNC: 4 MMOL/L — SIGNIFICANT CHANGE UP (ref 3.5–5)
PROT SERPL-MCNC: 6.4 G/DL — SIGNIFICANT CHANGE UP (ref 6–8)
RBC # BLD: 5.15 M/UL — SIGNIFICANT CHANGE UP (ref 4.2–5.4)
RBC # FLD: 13.9 % — SIGNIFICANT CHANGE UP (ref 11.5–14.5)
SAO2 % BLDV: 41.8 % — LOW (ref 67–88)
SODIUM SERPL-SCNC: 141 MMOL/L — SIGNIFICANT CHANGE UP (ref 135–146)
WBC # BLD: 13.28 K/UL — HIGH (ref 4.8–10.8)
WBC # FLD AUTO: 13.28 K/UL — HIGH (ref 4.8–10.8)

## 2024-04-29 PROCEDURE — 93010 ELECTROCARDIOGRAM REPORT: CPT

## 2024-04-29 PROCEDURE — 71045 X-RAY EXAM CHEST 1 VIEW: CPT | Mod: 26

## 2024-04-29 PROCEDURE — 99285 EMERGENCY DEPT VISIT HI MDM: CPT

## 2024-04-29 RX ORDER — IPRATROPIUM/ALBUTEROL SULFATE 18-103MCG
3 AEROSOL WITH ADAPTER (GRAM) INHALATION
Refills: 0 | Status: COMPLETED | OUTPATIENT
Start: 2024-04-29 | End: 2024-04-29

## 2024-04-29 RX ADMIN — Medication 125 MILLIGRAM(S): at 23:19

## 2024-04-29 RX ADMIN — Medication 3 MILLILITER(S): at 23:21

## 2024-04-30 ENCOUNTER — RESULT REVIEW (OUTPATIENT)
Age: 74
End: 2024-04-30

## 2024-04-30 DIAGNOSIS — J44.1 CHRONIC OBSTRUCTIVE PULMONARY DISEASE WITH (ACUTE) EXACERBATION: ICD-10-CM

## 2024-04-30 LAB
ALBUMIN SERPL ELPH-MCNC: 3.9 G/DL — SIGNIFICANT CHANGE UP (ref 3.5–5.2)
ALP SERPL-CCNC: 113 U/L — SIGNIFICANT CHANGE UP (ref 30–115)
ALT FLD-CCNC: 6 U/L — SIGNIFICANT CHANGE UP (ref 0–41)
ANION GAP SERPL CALC-SCNC: 14 MMOL/L — SIGNIFICANT CHANGE UP (ref 7–14)
AST SERPL-CCNC: 10 U/L — SIGNIFICANT CHANGE UP (ref 0–41)
BASOPHILS # BLD AUTO: 0 K/UL — SIGNIFICANT CHANGE UP (ref 0–0.2)
BASOPHILS NFR BLD AUTO: 0 % — SIGNIFICANT CHANGE UP (ref 0–1)
BILIRUB SERPL-MCNC: 0.2 MG/DL — SIGNIFICANT CHANGE UP (ref 0.2–1.2)
BUN SERPL-MCNC: 19 MG/DL — SIGNIFICANT CHANGE UP (ref 10–20)
CALCIUM SERPL-MCNC: 9.2 MG/DL — SIGNIFICANT CHANGE UP (ref 8.4–10.5)
CHLORIDE SERPL-SCNC: 103 MMOL/L — SIGNIFICANT CHANGE UP (ref 98–110)
CHOLEST SERPL-MCNC: 144 MG/DL — SIGNIFICANT CHANGE UP
CO2 SERPL-SCNC: 23 MMOL/L — SIGNIFICANT CHANGE UP (ref 17–32)
CREAT SERPL-MCNC: 0.7 MG/DL — SIGNIFICANT CHANGE UP (ref 0.7–1.5)
EGFR: 90 ML/MIN/1.73M2 — SIGNIFICANT CHANGE UP
EOSINOPHIL # BLD AUTO: 0.01 K/UL — SIGNIFICANT CHANGE UP (ref 0–0.7)
EOSINOPHIL NFR BLD AUTO: 0.1 % — SIGNIFICANT CHANGE UP (ref 0–8)
GLUCOSE SERPL-MCNC: 165 MG/DL — HIGH (ref 70–99)
HCT VFR BLD CALC: 39.2 % — SIGNIFICANT CHANGE UP (ref 37–47)
HDLC SERPL-MCNC: 58 MG/DL — SIGNIFICANT CHANGE UP
HGB BLD-MCNC: 13.1 G/DL — SIGNIFICANT CHANGE UP (ref 12–16)
IMM GRANULOCYTES NFR BLD AUTO: 0.2 % — SIGNIFICANT CHANGE UP (ref 0.1–0.3)
LIPID PNL WITH DIRECT LDL SERPL: 77 MG/DL — SIGNIFICANT CHANGE UP
LYMPHOCYTES # BLD AUTO: 0.53 K/UL — LOW (ref 1.2–3.4)
LYMPHOCYTES # BLD AUTO: 5.4 % — LOW (ref 20.5–51.1)
MAGNESIUM SERPL-MCNC: 1.8 MG/DL — SIGNIFICANT CHANGE UP (ref 1.8–2.4)
MCHC RBC-ENTMCNC: 27.9 PG — SIGNIFICANT CHANGE UP (ref 27–31)
MCHC RBC-ENTMCNC: 33.4 G/DL — SIGNIFICANT CHANGE UP (ref 32–37)
MCV RBC AUTO: 83.4 FL — SIGNIFICANT CHANGE UP (ref 81–99)
MONOCYTES # BLD AUTO: 0.09 K/UL — LOW (ref 0.1–0.6)
MONOCYTES NFR BLD AUTO: 0.9 % — LOW (ref 1.7–9.3)
NEUTROPHILS # BLD AUTO: 9.22 K/UL — HIGH (ref 1.4–6.5)
NEUTROPHILS NFR BLD AUTO: 93.4 % — HIGH (ref 42.2–75.2)
NON HDL CHOLESTEROL: 86 MG/DL — SIGNIFICANT CHANGE UP
NRBC # BLD: 0 /100 WBCS — SIGNIFICANT CHANGE UP (ref 0–0)
PHOSPHATE SERPL-MCNC: 3 MG/DL — SIGNIFICANT CHANGE UP (ref 2.1–4.9)
PLATELET # BLD AUTO: 307 K/UL — SIGNIFICANT CHANGE UP (ref 130–400)
PMV BLD: 10.6 FL — HIGH (ref 7.4–10.4)
POTASSIUM SERPL-MCNC: 3.7 MMOL/L — SIGNIFICANT CHANGE UP (ref 3.5–5)
POTASSIUM SERPL-SCNC: 3.7 MMOL/L — SIGNIFICANT CHANGE UP (ref 3.5–5)
PROT SERPL-MCNC: 6.3 G/DL — SIGNIFICANT CHANGE UP (ref 6–8)
RBC # BLD: 4.7 M/UL — SIGNIFICANT CHANGE UP (ref 4.2–5.4)
RBC # FLD: 14 % — SIGNIFICANT CHANGE UP (ref 11.5–14.5)
SODIUM SERPL-SCNC: 140 MMOL/L — SIGNIFICANT CHANGE UP (ref 135–146)
TRIGL SERPL-MCNC: 44 MG/DL — SIGNIFICANT CHANGE UP
TROPONIN T, HIGH SENSITIVITY RESULT: 56 NG/L — CRITICAL HIGH (ref 6–13)
TROPONIN T, HIGH SENSITIVITY RESULT: 83 NG/L — CRITICAL HIGH (ref 6–13)
TROPONIN T, HIGH SENSITIVITY RESULT: 99 NG/L — CRITICAL HIGH (ref 6–13)
WBC # BLD: 9.87 K/UL — SIGNIFICANT CHANGE UP (ref 4.8–10.8)
WBC # FLD AUTO: 9.87 K/UL — SIGNIFICANT CHANGE UP (ref 4.8–10.8)

## 2024-04-30 PROCEDURE — 99291 CRITICAL CARE FIRST HOUR: CPT

## 2024-04-30 PROCEDURE — 93306 TTE W/DOPPLER COMPLETE: CPT | Mod: 26

## 2024-04-30 PROCEDURE — 99233 SBSQ HOSP IP/OBS HIGH 50: CPT

## 2024-04-30 PROCEDURE — 80061 LIPID PANEL: CPT

## 2024-04-30 PROCEDURE — 84145 PROCALCITONIN (PCT): CPT

## 2024-04-30 PROCEDURE — 36415 COLL VENOUS BLD VENIPUNCTURE: CPT

## 2024-04-30 PROCEDURE — 83735 ASSAY OF MAGNESIUM: CPT

## 2024-04-30 PROCEDURE — 99223 1ST HOSP IP/OBS HIGH 75: CPT

## 2024-04-30 PROCEDURE — 80053 COMPREHEN METABOLIC PANEL: CPT

## 2024-04-30 PROCEDURE — 84100 ASSAY OF PHOSPHORUS: CPT

## 2024-04-30 PROCEDURE — 71275 CT ANGIOGRAPHY CHEST: CPT | Mod: 26

## 2024-04-30 PROCEDURE — 84484 ASSAY OF TROPONIN QUANT: CPT

## 2024-04-30 PROCEDURE — 97162 PT EVAL MOD COMPLEX 30 MIN: CPT | Mod: GP

## 2024-04-30 PROCEDURE — 93005 ELECTROCARDIOGRAM TRACING: CPT

## 2024-04-30 PROCEDURE — 71275 CT ANGIOGRAPHY CHEST: CPT | Mod: MC

## 2024-04-30 PROCEDURE — 94640 AIRWAY INHALATION TREATMENT: CPT

## 2024-04-30 PROCEDURE — 71045 X-RAY EXAM CHEST 1 VIEW: CPT

## 2024-04-30 PROCEDURE — 85025 COMPLETE CBC W/AUTO DIFF WBC: CPT

## 2024-04-30 PROCEDURE — 93306 TTE W/DOPPLER COMPLETE: CPT

## 2024-04-30 RX ORDER — PANTOPRAZOLE SODIUM 20 MG/1
40 TABLET, DELAYED RELEASE ORAL
Refills: 0 | Status: DISCONTINUED | OUTPATIENT
Start: 2024-04-30 | End: 2024-05-01

## 2024-04-30 RX ORDER — ATORVASTATIN CALCIUM 80 MG/1
40 TABLET, FILM COATED ORAL AT BEDTIME
Refills: 0 | Status: DISCONTINUED | OUTPATIENT
Start: 2024-04-30 | End: 2024-05-01

## 2024-04-30 RX ORDER — CHOLECALCIFEROL (VITAMIN D3) 125 MCG
0.5 CAPSULE ORAL
Refills: 0 | DISCHARGE

## 2024-04-30 RX ORDER — AZITHROMYCIN 500 MG/1
TABLET, FILM COATED ORAL
Refills: 0 | Status: DISCONTINUED | OUTPATIENT
Start: 2024-04-30 | End: 2024-05-01

## 2024-04-30 RX ORDER — ASPIRIN/CALCIUM CARB/MAGNESIUM 324 MG
81 TABLET ORAL DAILY
Refills: 0 | Status: DISCONTINUED | OUTPATIENT
Start: 2024-04-30 | End: 2024-05-01

## 2024-04-30 RX ORDER — FLUTICASONE PROPIONATE AND SALMETEROL 50; 250 UG/1; UG/1
1 POWDER ORAL; RESPIRATORY (INHALATION)
Refills: 0 | DISCHARGE

## 2024-04-30 RX ORDER — CHLORHEXIDINE GLUCONATE 213 G/1000ML
1 SOLUTION TOPICAL
Refills: 0 | Status: DISCONTINUED | OUTPATIENT
Start: 2024-04-30 | End: 2024-05-01

## 2024-04-30 RX ORDER — LISINOPRIL 2.5 MG/1
40 TABLET ORAL DAILY
Refills: 0 | Status: DISCONTINUED | OUTPATIENT
Start: 2024-04-30 | End: 2024-05-01

## 2024-04-30 RX ORDER — ASPIRIN/CALCIUM CARB/MAGNESIUM 324 MG
1 TABLET ORAL
Refills: 0 | DISCHARGE

## 2024-04-30 RX ORDER — SODIUM CHLORIDE 9 MG/ML
1000 INJECTION INTRAMUSCULAR; INTRAVENOUS; SUBCUTANEOUS
Refills: 0 | Status: DISCONTINUED | OUTPATIENT
Start: 2024-04-30 | End: 2024-04-30

## 2024-04-30 RX ORDER — AZITHROMYCIN 500 MG/1
500 TABLET, FILM COATED ORAL EVERY 24 HOURS
Refills: 0 | Status: DISCONTINUED | OUTPATIENT
Start: 2024-05-01 | End: 2024-05-01

## 2024-04-30 RX ORDER — METOPROLOL TARTRATE 50 MG
12.5 TABLET ORAL EVERY 12 HOURS
Refills: 0 | Status: DISCONTINUED | OUTPATIENT
Start: 2024-04-30 | End: 2024-04-30

## 2024-04-30 RX ORDER — CARVEDILOL PHOSPHATE 80 MG/1
3.12 CAPSULE, EXTENDED RELEASE ORAL EVERY 12 HOURS
Refills: 0 | Status: DISCONTINUED | OUTPATIENT
Start: 2024-04-30 | End: 2024-05-01

## 2024-04-30 RX ORDER — AZITHROMYCIN 500 MG/1
500 TABLET, FILM COATED ORAL ONCE
Refills: 0 | Status: COMPLETED | OUTPATIENT
Start: 2024-04-30 | End: 2024-04-30

## 2024-04-30 RX ORDER — HEPARIN SODIUM 5000 [USP'U]/ML
5000 INJECTION INTRAVENOUS; SUBCUTANEOUS EVERY 8 HOURS
Refills: 0 | Status: DISCONTINUED | OUTPATIENT
Start: 2024-04-30 | End: 2024-05-01

## 2024-04-30 RX ORDER — IPRATROPIUM/ALBUTEROL SULFATE 18-103MCG
3 AEROSOL WITH ADAPTER (GRAM) INHALATION EVERY 6 HOURS
Refills: 0 | Status: DISCONTINUED | OUTPATIENT
Start: 2024-04-30 | End: 2024-05-01

## 2024-04-30 RX ORDER — IPRATROPIUM/ALBUTEROL SULFATE 18-103MCG
3 AEROSOL WITH ADAPTER (GRAM) INHALATION EVERY 4 HOURS
Refills: 0 | Status: DISCONTINUED | OUTPATIENT
Start: 2024-04-30 | End: 2024-05-01

## 2024-04-30 RX ORDER — MAGNESIUM SULFATE 500 MG/ML
2 VIAL (ML) INJECTION ONCE
Refills: 0 | Status: COMPLETED | OUTPATIENT
Start: 2024-04-30 | End: 2024-04-30

## 2024-04-30 RX ADMIN — AZITHROMYCIN 255 MILLIGRAM(S): 500 TABLET, FILM COATED ORAL at 09:50

## 2024-04-30 RX ADMIN — SODIUM CHLORIDE 75 MILLILITER(S): 9 INJECTION INTRAMUSCULAR; INTRAVENOUS; SUBCUTANEOUS at 03:30

## 2024-04-30 RX ADMIN — Medication 3 MILLILITER(S): at 00:47

## 2024-04-30 RX ADMIN — ATORVASTATIN CALCIUM 40 MILLIGRAM(S): 80 TABLET, FILM COATED ORAL at 21:02

## 2024-04-30 RX ADMIN — Medication 3 MILLILITER(S): at 13:07

## 2024-04-30 RX ADMIN — Medication 3 MILLILITER(S): at 00:00

## 2024-04-30 RX ADMIN — HEPARIN SODIUM 5000 UNIT(S): 5000 INJECTION INTRAVENOUS; SUBCUTANEOUS at 05:52

## 2024-04-30 RX ADMIN — LISINOPRIL 40 MILLIGRAM(S): 2.5 TABLET ORAL at 05:53

## 2024-04-30 RX ADMIN — HEPARIN SODIUM 5000 UNIT(S): 5000 INJECTION INTRAVENOUS; SUBCUTANEOUS at 21:02

## 2024-04-30 RX ADMIN — HEPARIN SODIUM 5000 UNIT(S): 5000 INJECTION INTRAVENOUS; SUBCUTANEOUS at 16:25

## 2024-04-30 RX ADMIN — Medication 25 GRAM(S): at 06:48

## 2024-04-30 RX ADMIN — Medication 81 MILLIGRAM(S): at 11:22

## 2024-04-30 RX ADMIN — PANTOPRAZOLE SODIUM 40 MILLIGRAM(S): 20 TABLET, DELAYED RELEASE ORAL at 06:24

## 2024-04-30 RX ADMIN — Medication 3 MILLILITER(S): at 20:25

## 2024-04-30 RX ADMIN — Medication 40 MILLIGRAM(S): at 17:27

## 2024-04-30 RX ADMIN — CARVEDILOL PHOSPHATE 3.12 MILLIGRAM(S): 80 CAPSULE, EXTENDED RELEASE ORAL at 21:03

## 2024-04-30 RX ADMIN — CHLORHEXIDINE GLUCONATE 1 APPLICATION(S): 213 SOLUTION TOPICAL at 06:00

## 2024-04-30 NOTE — H&P ADULT - CRITICAL CARE ATTENDING COMMENT
Pt seen w PA and agree w above. Pt is 72 yo female PMHx sig for Alzheimer's dementia, CVA, COPD.  Brought in by patient's daughter for sob.  Pt still smokes  1-2 cigs/day. No cp/palp/cough.  Pt treated in ED for possible COPD exacerbation, found to have elevated troponin without EKG changes. Will treeat w asa, statin, b-blocker as tolerated.  Continue bronchodilators and solu-medrol 40mg IV q8h. Replete magnesium. Cardio and pulm evals. Pt is at risk for decompensation. Monitor in SDU.

## 2024-04-30 NOTE — PATIENT PROFILE ADULT - FALL HARM RISK - HARM RISK INTERVENTIONS

## 2024-04-30 NOTE — ED PROVIDER NOTE - OBJECTIVE STATEMENT
74 yo f with history of COPD, CVA, dementia, COVID , tobacco use.  Pt comes to ER c/o progressive SOB.  daughter states in the afternoon suddenly developed sob, called ems with them SPO2 was 83% placed on none rebreather and brought here. daughter states in the morning given asprin, also had albuterol and given that to mother. denies chest pain.

## 2024-04-30 NOTE — CONSULT NOTE ADULT - ASSESSMENT
72 yo W female w/ PMH of COPD, CVA, dementia, COVID , tobacco use.  Pt comes to ER c/o progressive SOB. She denies chest pain . She has hx CAD. She denies chest pain. Positive trop demand ischemia. Would medically rx. Family aware. They want take patient home. Beta statin asa . Probable home o2. Prognosis poor . Family aware
  IMPRESSION:  copd exacerbation   elevated trop       PLAN:    CNS: no sedation     HEENT: oral care     PULMONARY: keep pox >92%   solumedrol 60 mg Q12 hrs   nebulizer Q4 hrs and prn   upon d/c patient need to be on laba/inhaled steroids /lama   check pox on RA rest and exertion   smoking cessation   outpatient follow up     CARDIOVASCULAR: follow CE   cardiology consult   echo     GI: GI prophylaxis.  Feeding      RENAL: follow lytes replace Mg     INFECTIOUS DISEASE: azithromycin 5 days   procal       HEMATOLOGICAL:  DVT prophylaxis.    ENDOCRINE:  Follow up FS.  Insulin protocol if needed.    disposition as per cardiology   from pulmonary can be downgrade to floor         CRITICAL CARE TIME SPENT: ***

## 2024-04-30 NOTE — CONSULT NOTE ADULT - SUBJECTIVE AND OBJECTIVE BOX
Patient is a 73y old  Female who presents with a chief complaint of COPD exacerbation w/ elevated troponins (30 Apr 2024 01:01)      HPI:  72 yo W female w/ PMH of COPD, CVA, dementia, COVID , tobacco use.  Pt comes to ER c/o progressive SOB.  Additionally pt w/ elevated troponin. pt  on adm was slightly tachypneic &  tachycardic . EKG unremarkable.  Pt's active  chronic tobacco user( smokes 2 cigarettes/day)  Pt denied : CP, n/v,  Pt for adm to ICU stepdown  (30 Apr 2024 01:01)  patient used to be seen by pulmonologist in [place] and then seen by Dr Serrano once   the daughter this morning she want to be seen by our group   patient now comfortable     PAST MEDICAL & SURGICAL HISTORY:  COPD exacerbation      COVID-19      Tobacco user      HTN (hypertension)      CVA (cerebral vascular accident)      No significant past surgical history        Allergies    penicillin (Unknown)    Intolerances      Family history : no cardiovscular family history   Home Medications:  Advair Diskus 500 mcg-50 mcg inhalation powder: 1 inhaled 2 times a day (30 Apr 2024 01:30)  Aspir 81 oral delayed release tablet: 1 tab(s) orally once a day (30 Apr 2024 01:30)  D3 125 mcg/0.5 mL (5000 intl units/0.5 mL) oral liquid: 0.5 milliliter(s) orally (30 Apr 2024 01:30)  lisinopril 40 mg oral tablet: 1 tab(s) orally once a day (30 Apr 2024 01:30)    Occupation:  Alochol: Denied  Smoking: Denied  Drug Use: Denied  Marital Status:         ROS: as in HPI; All other systems reviewed are negative    ICU Vital Signs Last 24 Hrs  T(C): 36.1 (30 Apr 2024 07:01), Max: 36.1 (30 Apr 2024 07:01)  T(F): 97 (30 Apr 2024 07:01), Max: 97 (30 Apr 2024 07:01)  HR: 95 (30 Apr 2024 05:51) (95 - 134)  BP: 109/69 (30 Apr 2024 05:51) (89/62 - 175/103)  BP(mean): 84 (30 Apr 2024 05:51) (84 - 94)  ABP: --  ABP(mean): --  RR: 16 (30 Apr 2024 07:01) (15 - 34)  SpO2: 95% (30 Apr 2024 05:51) (86% - 96%)    O2 Parameters below as of 30 Apr 2024 02:40  Patient On (Oxygen Delivery Method): nasal cannula  O2 Flow (L/min): 3            Physical Examination:    General: No acute distress.  Alert, oriented, interactive, nonfocal    HEENT: Pupils equal, reactive to light.  Symmetric.    PULM: b/l wheeze     CVS: Regular rate and rhythm, no murmurs, rubs, or gallops    ABD: Soft, nondistended, nontender, normoactive bowel sounds, no masses    EXT: No edema, nontender, no clubbing     SKIN: Warm and well perfused, no rashes noted.    Neurology : no motor or sensory deficit                   I&O's Detail        LABS:                        13.8   13.28 )-----------( 288      ( 29 Apr 2024 23:14 )             42.4     29 Apr 2024 23:14    141    |  104    |  21     ----------------------------<  179    4.0     |  28     |  0.7      Ca    9.3        29 Apr 2024 23:14  Mg     1.7       29 Apr 2024 23:14    TPro  6.4    /  Alb  4.0    /  TBili  0.3    /  DBili  x      /  AST  10     /  ALT  6      /  AlkPhos  110    29 Apr 2024 23:14  Amylase x     lipase x              CAPILLARY BLOOD GLUCOSE          Urinalysis Basic - ( 29 Apr 2024 23:14 )    Color: x / Appearance: x / SG: x / pH: x  Gluc: 179 mg/dL / Ketone: x  / Bili: x / Urobili: x   Blood: x / Protein: x / Nitrite: x   Leuk Esterase: x / RBC: x / WBC x   Sq Epi: x / Non Sq Epi: x / Bacteria: x      Culture        MEDICATIONS  (STANDING):  aspirin enteric coated 81 milliGRAM(s) Oral daily  chlorhexidine 2% Cloths 1 Application(s) Topical <User Schedule>  heparin   Injectable 5000 Unit(s) SubCutaneous every 8 hours  lisinopril 40 milliGRAM(s) Oral daily  methylPREDNISolone sodium succinate Injectable 40 milliGRAM(s) IV Push every 8 hours  metoprolol tartrate 12.5 milliGRAM(s) Oral every 12 hours  pantoprazole    Tablet 40 milliGRAM(s) Oral before breakfast  sodium chloride 0.9%. 1000 milliLiter(s) (75 mL/Hr) IV Continuous <Continuous>    MEDICATIONS  (PRN):  albuterol/ipratropium for Nebulization 3 milliLiter(s) Nebulizer every 6 hours PRN Shortness of Breath and/or Wheezing        RADIOLOGY: ***     CXR:  TLC:  OG:  ET tube:        CAM ICU:  ECHO:

## 2024-04-30 NOTE — ED PROVIDER NOTE - PHYSICAL EXAMINATION
CON: appears stated age, pleasant, no acute distress, HENMT: normocephalic, atraumatic, anicteric, no conjunctival injection,  CV: tachycardic regular rhythm, distal pulses intact, RESP: no acute respiratory distress, no stridor, on none rebreather , GI:  soft, nontender, no rebound, no guarding, SKIN: no wounds MSK: no deformities, NEURO: no gross motor or sensory deficit Psychiatric: appropriate mood, appropriate affect

## 2024-04-30 NOTE — H&P ADULT - NSHPPHYSICALEXAM_GEN_ALL_CORE
GENERAL:  72y/o fraile W  Female NAD, resting comfortably.  HEAD:  Atraumatic, Normocephalic  EYES: EOMI, PERRLA, conjunctiva and sclera clear  NECK: Supple, No JVD, no cervical lymphadenopathy, non-tender  CHEST/LUNG: diminished  to auscultation bilaterally; No wheeze, rhonchi, or rales  HEART: Regular rate and rhythm; S1&S2  ABDOMEN: Soft, Nontender, Nondistended x 4 quadrants; Bowel sounds present  EXTREMITIES:   Peripheral Pulses Present, No clubbing, no cyanosis, or no edema, no calf tenderness  PSYCH: AAOx3, cooperative, appropriate  NEUROLOGY: WNL  SKIN: WNL

## 2024-04-30 NOTE — ED PROVIDER NOTE - CLINICAL SUMMARY MEDICAL DECISION MAKING FREE TEXT BOX
72 yo f with history of COPD, CVA, dementia, COVID , tobacco use.  Pt comes to ER c/o progressive SOB.  daughter states in the afternoon suddenly developed sob, called ems with them SPO2 was 83% placed on none rebreather and brought here. daughter states in the morning given asprin, also had albuterol and given that to mother. denies chest pain.. exam showed tachycardic, lungs clear, labs showed critically elevated troponin, intensive service consutled which recommended admission to SDU. daughter at bedside agreeable. ecg no ischemic changes.

## 2024-04-30 NOTE — PROGRESS NOTE ADULT - CONVERSATION DETAILS
dw the daughter Anya Genao about her mother GOCs, she says shes not ready yet and will discuss the GOCs with the rest of her family and then will let us know

## 2024-04-30 NOTE — H&P ADULT - HISTORY OF PRESENT ILLNESS
72 yo W female w/ PMH of COPD, CVA, dementia, COVID , tobacco use.  Pt comes to ER c/o progressive SOB.  Additionally pt w/ elevated troponin. pt  on adm was slightly tachypneic &  tachycardic . EKG unremarkable.  Pt's active  chronic tobacco user( smokes 2 cigarettes/day)  Pt denied : CP, n/v,  Pt for adm to ICU stepdown

## 2024-04-30 NOTE — PHYSICAL THERAPY INITIAL EVALUATION ADULT - SPECIFY REASON(S)
Attempted to see pt this PM for Bedside PT , Pt Reports she is not feeling to get out of bed , Son at bedside Requested for later time ,PT will f/u as appropriated , RN Aware.

## 2024-04-30 NOTE — H&P ADULT - ASSESSMENT
Elderly W female w/ COPD exacerbation w/ elevated troponins r/o NSTEMI  Hx-COPD, tobacco use, HTN    Plan:  pt seen w/ ICU MD Morales  adm to ICU stepdown  trend troponins  ASA 81mg , heparin sq, cardio eval  echo  cont duonebs as needed  supplement low mag  cont solumedrol

## 2024-04-30 NOTE — H&P ADULT - NSICDXPASTMEDICALHX_GEN_ALL_CORE_FT
PAST MEDICAL HISTORY:  COPD exacerbation     COVID-19     CVA (cerebral vascular accident)     HTN (hypertension)     Tobacco user

## 2024-04-30 NOTE — CONSULT NOTE ADULT - SUBJECTIVE AND OBJECTIVE BOX
CARDIOLOGY CONSULT NOTE     CHIEF COMPLAINT/REASON FOR CONSULT:    HPI:  74 yo W female w/ PMH of COPD, CVA, dementia, COVID , tobacco use.  Pt comes to ER c/o progressive SOB.  Additionally pt w/ elevated troponin. pt  on adm was slightly tachypneic &  tachycardic . EKG unremarkable.  Pt's active  chronic tobacco user( smokes 2 cigarettes/day)  Pt denied : CP, n/v,  Pt for adm to ICU stepdown  (30 Apr 2024 01:01)      PAST MEDICAL & SURGICAL HISTORY:  COPD exacerbation      COVID-19      Tobacco user      HTN (hypertension)      CVA (cerebral vascular accident)      No significant past surgical history          Cardiac Risks:   [x ]HTN, [ ] DM, [x ] Smoking, [ ] FH,  [ ] Lipids        MEDICATIONS:  MEDICATIONS  (STANDING):  aspirin enteric coated 81 milliGRAM(s) Oral daily  azithromycin  IVPB      chlorhexidine 2% Cloths 1 Application(s) Topical <User Schedule>  heparin   Injectable 5000 Unit(s) SubCutaneous every 8 hours  lisinopril 40 milliGRAM(s) Oral daily  methylPREDNISolone sodium succinate Injectable 40 milliGRAM(s) IV Push every 12 hours  metoprolol tartrate 12.5 milliGRAM(s) Oral every 12 hours  pantoprazole    Tablet 40 milliGRAM(s) Oral before breakfast  sodium chloride 0.9%. 1000 milliLiter(s) (75 mL/Hr) IV Continuous <Continuous>      FAMILY HISTORY:      SOCIAL HISTORY:        Allergies    penicillin (Unknown)      	    REVIEW OF SYSTEMS:  CONSTITUTIONAL: No fever, weight loss, or fatigue  EYES: No eye pain, visual disturbances, or discharge  ENMT:  No difficulty hearing, tinnitus, vertigo; No sinus or throat pain  NECK: No pain or stiffness  RESPIRATORY: No cough, wheezing, chills or hemoptysis; No Shortness of Breath  CARDIOVASCULAR: No chest pain, palpitations, passing out, dizziness, or leg swelling  GASTROINTESTINAL: No abdominal or epigastric pain. No nausea, vomiting, or hematemesis; No diarrhea or constipation. No melena or hematochezia.  GENITOURINARY: No dysuria, frequency, hematuria, or incontinence  NEUROLOGICAL: No headaches, memory loss, loss of strength, numbness, or tremors  SKIN: No itching, burning, rashes, or lesions   	        PHYSICAL EXAM:  T(C): 36.1 (04-30-24 @ 07:01), Max: 36.1 (04-30-24 @ 07:01)  HR: 94 (04-30-24 @ 07:03) (94 - 134)  BP: 107/70 (04-30-24 @ 07:03) (89/62 - 175/103)  RR: 27 (04-30-24 @ 07:03) (15 - 34)  SpO2: 91% (04-30-24 @ 07:03) (86% - 96%)  Wt(kg): --  I&O's Summary      Appearance: Normal	  Psychiatry: A & O x 3, Mood & affect appropriate  HEENT:   Normal oral mucosa, PERRL, EOMI	  Lymphatic: No lymphadenopathy  Cardiovascular: Normal S1 S2,RRR, No JVD, No murmurs  Respiratory: Lungs clear to auscultation	  Gastrointestinal:  Soft, Non-tender, + BS	  Skin: No rashes, No ecchymoses, No cyanosis	  Neurologic: Non-focal  Extremities: Normal range of motion, No clubbing, cyanosis or edema  Vascular: Peripheral pulses palpable 2+ bilaterally      ECG:  	  < from: 12 Lead ECG (05.23.23 @ 14:33) >  Diagnosis Line Unusual P axis and short MI, probable junctional tachycardia with Premature  atrial complexes  Abnormal ECG    Confirmed by KEE KAYE MD ([id number]) on 5/23/2023 3:10:20 PM    < end of copied text >    	  LABS:	 	    CARDIAC MARKERS:                                    13.1   9.87  )-----------( 307      ( 30 Apr 2024 05:58 )             39.2     04-30    140  |  103  |  19  ----------------------------<  165<H>  3.7   |  23  |  0.7    Ca    9.2      30 Apr 2024 05:58  Phos  3.0     04-30  Mg     1.8     04-30    TPro  6.3  /  Alb  3.9  /  TBili  0.2  /  DBili  x   /  AST  10  /  ALT  6   /  AlkPhos  113  04-30

## 2024-04-30 NOTE — H&P ADULT - NSHPLABSRESULTS_GEN_ALL_CORE
13.8   13.28 )-----------( 288      ( 29 Apr 2024 23:14 )             42.4       04-29    141  |  104  |  21<H>  ----------------------------<  179<H>  4.0   |  28  |  0.7    Ca    9.3      29 Apr 2024 23:14  Mg     1.7     04-29    TPro  6.4  /  Alb  4.0  /  TBili  0.3  /  DBili  x   /  AST  10  /  ALT  6   /  AlkPhos  110  04-29              Urinalysis Basic - ( 29 Apr 2024 23:14 )    Color: x / Appearance: x / SG: x / pH: x  Gluc: 179 mg/dL / Ketone: x  / Bili: x / Urobili: x   Blood: x / Protein: x / Nitrite: x   Leuk Esterase: x / RBC: x / WBC x   Sq Epi: x / Non Sq Epi: x / Bacteria: x            Lactate Trend            CAPILLARY BLOOD GLUCOSE

## 2024-05-01 ENCOUNTER — TRANSCRIPTION ENCOUNTER (OUTPATIENT)
Age: 74
End: 2024-05-01

## 2024-05-01 VITALS
HEART RATE: 118 BPM | SYSTOLIC BLOOD PRESSURE: 130 MMHG | RESPIRATION RATE: 30 BRPM | DIASTOLIC BLOOD PRESSURE: 79 MMHG | OXYGEN SATURATION: 93 %

## 2024-05-01 LAB
ALBUMIN SERPL ELPH-MCNC: 3.7 G/DL — SIGNIFICANT CHANGE UP (ref 3.5–5.2)
ALP SERPL-CCNC: 101 U/L — SIGNIFICANT CHANGE UP (ref 30–115)
ALT FLD-CCNC: <5 U/L — SIGNIFICANT CHANGE UP (ref 0–41)
ANION GAP SERPL CALC-SCNC: 10 MMOL/L — SIGNIFICANT CHANGE UP (ref 7–14)
AST SERPL-CCNC: 10 U/L — SIGNIFICANT CHANGE UP (ref 0–41)
BASOPHILS # BLD AUTO: 0 K/UL — SIGNIFICANT CHANGE UP (ref 0–0.2)
BASOPHILS NFR BLD AUTO: 0 % — SIGNIFICANT CHANGE UP (ref 0–1)
BILIRUB SERPL-MCNC: 0.2 MG/DL — SIGNIFICANT CHANGE UP (ref 0.2–1.2)
BUN SERPL-MCNC: 19 MG/DL — SIGNIFICANT CHANGE UP (ref 10–20)
CALCIUM SERPL-MCNC: 9 MG/DL — SIGNIFICANT CHANGE UP (ref 8.4–10.5)
CHLORIDE SERPL-SCNC: 107 MMOL/L — SIGNIFICANT CHANGE UP (ref 98–110)
CO2 SERPL-SCNC: 27 MMOL/L — SIGNIFICANT CHANGE UP (ref 17–32)
CREAT SERPL-MCNC: 0.6 MG/DL — LOW (ref 0.7–1.5)
EGFR: 94 ML/MIN/1.73M2 — SIGNIFICANT CHANGE UP
EOSINOPHIL NFR BLD AUTO: 0 % — SIGNIFICANT CHANGE UP (ref 0–8)
GLUCOSE SERPL-MCNC: 129 MG/DL — HIGH (ref 70–99)
HCT VFR BLD CALC: 37.8 % — SIGNIFICANT CHANGE UP (ref 37–47)
HGB BLD-MCNC: 12.5 G/DL — SIGNIFICANT CHANGE UP (ref 12–16)
LYMPHOCYTES # BLD AUTO: 0.18 K/UL — LOW (ref 1.2–3.4)
LYMPHOCYTES # BLD AUTO: 1 % — LOW (ref 20.5–51.1)
MAGNESIUM SERPL-MCNC: 2.1 MG/DL — SIGNIFICANT CHANGE UP (ref 1.8–2.4)
MANUAL SMEAR VERIFICATION: SIGNIFICANT CHANGE UP
MCHC RBC-ENTMCNC: 27.5 PG — SIGNIFICANT CHANGE UP (ref 27–31)
MCHC RBC-ENTMCNC: 33.1 G/DL — SIGNIFICANT CHANGE UP (ref 32–37)
MCV RBC AUTO: 83.1 FL — SIGNIFICANT CHANGE UP (ref 81–99)
MONOCYTES # BLD AUTO: 1.59 K/UL — HIGH (ref 0.1–0.6)
MONOCYTES NFR BLD AUTO: 9 % — SIGNIFICANT CHANGE UP (ref 1.7–9.3)
NEUTROPHILS # BLD AUTO: 15.89 K/UL — HIGH (ref 1.4–6.5)
NEUTROPHILS NFR BLD AUTO: 90 % — HIGH (ref 42.2–75.2)
NRBC # BLD: 0 /100 WBCS — SIGNIFICANT CHANGE UP (ref 0–0)
NRBC # BLD: SIGNIFICANT CHANGE UP /100 WBCS (ref 0–0)
PLAT MORPH BLD: NORMAL — SIGNIFICANT CHANGE UP
PLATELET # BLD AUTO: 292 K/UL — SIGNIFICANT CHANGE UP (ref 130–400)
PLATELET COUNT - ESTIMATE: NORMAL — SIGNIFICANT CHANGE UP
PMV BLD: 10.5 FL — HIGH (ref 7.4–10.4)
POTASSIUM SERPL-MCNC: 4.2 MMOL/L — SIGNIFICANT CHANGE UP (ref 3.5–5)
POTASSIUM SERPL-SCNC: 4.2 MMOL/L — SIGNIFICANT CHANGE UP (ref 3.5–5)
PROCALCITONIN SERPL-MCNC: 0.07 NG/ML — SIGNIFICANT CHANGE UP (ref 0.02–0.1)
PROT SERPL-MCNC: 6 G/DL — SIGNIFICANT CHANGE UP (ref 6–8)
RBC # BLD: 4.55 M/UL — SIGNIFICANT CHANGE UP (ref 4.2–5.4)
RBC # FLD: 14.2 % — SIGNIFICANT CHANGE UP (ref 11.5–14.5)
RBC BLD AUTO: NORMAL — SIGNIFICANT CHANGE UP
SODIUM SERPL-SCNC: 144 MMOL/L — SIGNIFICANT CHANGE UP (ref 135–146)
TROPONIN T, HIGH SENSITIVITY RESULT: 47 NG/L — HIGH (ref 6–13)
WBC # BLD: 17.66 K/UL — HIGH (ref 4.8–10.8)
WBC # FLD AUTO: 17.66 K/UL — HIGH (ref 4.8–10.8)

## 2024-05-01 PROCEDURE — 71045 X-RAY EXAM CHEST 1 VIEW: CPT | Mod: 26

## 2024-05-01 PROCEDURE — 93010 ELECTROCARDIOGRAM REPORT: CPT

## 2024-05-01 PROCEDURE — 99233 SBSQ HOSP IP/OBS HIGH 50: CPT

## 2024-05-01 PROCEDURE — 99238 HOSP IP/OBS DSCHRG MGMT 30/<: CPT | Mod: GC

## 2024-05-01 RX ORDER — CARVEDILOL PHOSPHATE 80 MG/1
1 CAPSULE, EXTENDED RELEASE ORAL
Qty: 60 | Refills: 0
Start: 2024-05-01 | End: 2024-05-30

## 2024-05-01 RX ORDER — METOPROLOL TARTRATE 50 MG
12.5 TABLET ORAL EVERY 12 HOURS
Refills: 0 | Status: DISCONTINUED | OUTPATIENT
Start: 2024-05-01 | End: 2024-05-01

## 2024-05-01 RX ORDER — ALBUTEROL 90 UG/1
2 AEROSOL, METERED ORAL
Qty: 1 | Refills: 0
Start: 2024-05-01 | End: 2024-05-30

## 2024-05-01 RX ORDER — TIOTROPIUM BROMIDE 18 UG/1
2 CAPSULE ORAL; RESPIRATORY (INHALATION)
Qty: 1 | Refills: 0
Start: 2024-05-01 | End: 2024-05-30

## 2024-05-01 RX ORDER — ATORVASTATIN CALCIUM 80 MG/1
1 TABLET, FILM COATED ORAL
Qty: 30 | Refills: 0
Start: 2024-05-01 | End: 2024-05-30

## 2024-05-01 RX ORDER — AZITHROMYCIN 500 MG/1
1 TABLET, FILM COATED ORAL
Qty: 3 | Refills: 0
Start: 2024-05-01 | End: 2024-05-03

## 2024-05-01 RX ORDER — METOPROLOL TARTRATE 50 MG
1 TABLET ORAL
Qty: 30 | Refills: 0
Start: 2024-05-01 | End: 2024-05-30

## 2024-05-01 RX ADMIN — PANTOPRAZOLE SODIUM 40 MILLIGRAM(S): 20 TABLET, DELAYED RELEASE ORAL at 05:52

## 2024-05-01 RX ADMIN — LISINOPRIL 40 MILLIGRAM(S): 2.5 TABLET ORAL at 05:52

## 2024-05-01 RX ADMIN — Medication 81 MILLIGRAM(S): at 11:59

## 2024-05-01 RX ADMIN — Medication 40 MILLIGRAM(S): at 05:52

## 2024-05-01 RX ADMIN — CHLORHEXIDINE GLUCONATE 1 APPLICATION(S): 213 SOLUTION TOPICAL at 05:53

## 2024-05-01 RX ADMIN — Medication 3 MILLILITER(S): at 07:31

## 2024-05-01 RX ADMIN — HEPARIN SODIUM 5000 UNIT(S): 5000 INJECTION INTRAVENOUS; SUBCUTANEOUS at 05:52

## 2024-05-01 RX ADMIN — AZITHROMYCIN 255 MILLIGRAM(S): 500 TABLET, FILM COATED ORAL at 09:19

## 2024-05-01 RX ADMIN — Medication 3 MILLILITER(S): at 13:20

## 2024-05-01 NOTE — DISCHARGE NOTE PROVIDER - CARE PROVIDERS DIRECT ADDRESSES
,[email or web address],DirectAddress_Unknown

## 2024-05-01 NOTE — PROVIDER CONTACT NOTE (OTHER) - ASSESSMENT
pt son at bedside and made aware that patient may need to be straight cath if patient does not urinate. pt family became upset and refusing straight cath, urging her to give her more time to attempt to urinate.

## 2024-05-01 NOTE — PHYSICAL THERAPY INITIAL EVALUATION ADULT - GENERAL OBSERVATIONS, REHAB EVAL
9;30-10;10 pt was seen for PT IE at bed side, pt is agreeable, chart thoroughly reviewed, RN Elodia is aware.  Pt was received semi farrell in bed, and, +IV, +telemonitoring, +BP cuff and +pulse ox, +primafit, +call bell within reach, bed side table at reach , daughter is at bed side

## 2024-05-01 NOTE — DISCHARGE NOTE PROVIDER - NSDCCPCAREPLAN_GEN_ALL_CORE_FT
PRINCIPAL DISCHARGE DIAGNOSIS  Diagnosis: COPD exacerbation  Assessment and Plan of Treatment: You were admitted for a COPD exacerbation. You were treated with inhalers, steroids and antibiotics. Your oxygen has improved and you're now on nasal cannula. Your oxygen saturation drops when you walk so you will be discharged with home oxygen as well as completing your antibiotics, steroids, and an additional inhaler to be used daily. On admission, you had mildly elevated troponins. Your ECG showed that you have atrial tachycardia so you were started on medications to regulate the heart rate. Cardiology saw you and you will get a stress test done outpatient for further evaluation. You are stable to be discharged home today.

## 2024-05-01 NOTE — CHART NOTE - NSCHARTNOTEFT_GEN_A_CORE
Patient requires a rolling walker for d/c to home due to diagnosis of COPD, CVA and dementia with functional limitations to help complete MRADL's.

## 2024-05-01 NOTE — PROVIDER CONTACT NOTE (OTHER) - SITUATION
pt noted to have not voided overnight. bladder scan of was done and showed 780cc in bladder. pt urged to void but was unsuccessful

## 2024-05-01 NOTE — PROVIDER CONTACT NOTE (OTHER) - ACTION/TREATMENT ORDERED:
LUCA Soriano made aware of situation. per LUCA Soriano, pt allowed 1 more hour in attempt to void. hold off straight cath for now.

## 2024-05-01 NOTE — DISCHARGE NOTE NURSING/CASE MANAGEMENT/SOCIAL WORK - NSDCPEWEB_GEN_ALL_CORE
[facility] for Tobacco Control website --- [email or web address] --- [email or web address]

## 2024-05-01 NOTE — PROGRESS NOTE ADULT - SUBJECTIVE AND OBJECTIVE BOX
Patient seen and evaluated this am, awake and confused, denies chest pain or SOB      T(F): 95.9 (05-01-24 @ 07:01), Max: 97.2 (04-30-24 @ 15:20)  HR: 89 (05-01-24 @ 08:25)  BP: 117/76 (05-01-24 @ 08:25)  RR: 20  SpO2: 94% (05-01-24 @ 08:25) (94% - 97%)    PHYSICAL EXAM:  GENERAL: NAD  HEAD:  Atraumatic, Normocephalic  EYES: EOMI, PERRLA, conjunctiva and sclera clear  NERVOUS SYSTEM:  Alert & confused , no focal deficits   CHEST/LUNG: Clear to percussion bilaterally; No rales, rhonchi, wheezing, or rubs  HEART: Regular rate and rhythm; No murmurs, rubs, or gallops  ABDOMEN: Soft, Nontender, Nondistended; Bowel sounds present  EXTREMITIES:  2+ Peripheral Pulses, No clubbing, cyanosis, or edema    LABS  05-01    144  |  107  |  19  ----------------------------<  129<H>  4.2   |  27  |  0.6<L>    Ca    9.0      01 May 2024 05:48  Phos  3.0     04-30  Mg     2.1     05-01    TPro  6.0  /  Alb  3.7  /  TBili  0.2  /  DBili  x   /  AST  10  /  ALT  <5  /  AlkPhos  101  05-01                          12.5   17.66 )-----------( 292      ( 01 May 2024 05:48 )             37.8         CARDIAC ENZYMES  Troponin T, High Sensitivity (05.01.24 @ 05:48)   Troponin T, High Sensitivity Result: 99 ->47      < from: TTE Echo Complete w/o Contrast w/ Doppler (04.30.24 @ 14:23) >    Summary:   1. Normal global left ventricular systolicfunction.   2. LV Ejection Fraction by Mon's Method with a biplane EF of 54 %.   3. Spectral Doppler shows impaired relaxation pattern of left   ventricular myocardial filling (Grade I diastolic dysfunction).   4. Normal right ventricular size and function.   5. Normal left atrial size.   6. Normal right atrial size.   7. Mild to moderate aortic regurgitation.   8. Mild mitral valve regurgitation.   9. Normal pulmonary artery pressure.  10. There is no evidence of pericardial effusion.    < end of copied text >      RADIOLOGY  < from: CT Angio Chest PE Protocol w/ IV Cont (04.30.24 @ 02:51) >    IMPRESSION:  1.  No evidence of acute pulmonary embolism.  2.  No acute lobar consolidation.  3.  Likely changes related to COPD.  4.  Diffuse osteopenia with multiple age-indeterminate lower thoracic and   upper lumbar vertebral body compression deformities.    < end of copied text >    MEDICATIONS  (STANDING):  albuterol/ipratropium for Nebulization 3 milliLiter(s) Nebulizer every 4 hours  aspirin enteric coated 81 milliGRAM(s) Oral daily  atorvastatin 40 milliGRAM(s) Oral at bedtime  azithromycin  IVPB 500 milliGRAM(s) IV Intermittent every 24 hours   chlorhexidine 2% Cloths 1 Application(s) Topical <User Schedule>  heparin   Injectable 5000 Unit(s) SubCutaneous every 8 hours  lisinopril 40 milliGRAM(s) Oral daily  methylPREDNISolone sodium succinate Injectable 40 milliGRAM(s) IV Push every 12 hours  metoprolol tartrate 12.5 milliGRAM(s) Oral every 12 hours  pantoprazole    Tablet 40 milliGRAM(s) Oral before breakfast    MEDICATIONS  (PRN):  albuterol/ipratropium for Nebulization 3 milliLiter(s) Nebulizer every 6 hours PRN Shortness of Breath and/or Wheezing    
Patient is a 73y old  Female who presents with a chief complaint of COPD exacerbation w/ elevated troponins (30 Apr 2024 14:05)      Over Night Events:  Patient seen and examined.   feel better       ROS:  See HPI    PHYSICAL EXAM    ICU Vital Signs Last 24 Hrs  T(C): 35.5 (01 May 2024 07:01), Max: 36.2 (30 Apr 2024 15:20)  T(F): 95.9 (01 May 2024 07:01), Max: 97.2 (30 Apr 2024 15:20)  HR: 78 (01 May 2024 05:12) (78 - 92)  BP: 117/63 (01 May 2024 05:12) (107/63 - 160/88)  BP(mean): 84 (01 May 2024 05:12) (79 - 84)  ABP: --  ABP(mean): --  RR: 24 (01 May 2024 07:01) (24 - 30)  SpO2: 97% (01 May 2024 05:12) (96% - 97%)    O2 Parameters below as of 01 May 2024 05:12  Patient On (Oxygen Delivery Method): nasal cannula  O2 Flow (L/min): 3          General:awake   HEENT:              rafa  Lymph Nodes: NO cervical LN   Lungs: Bilateral BS very mild exp wheeze   Cardiovascular: Regular   Abdomen: Soft, Positive BS  Extremities: No clubbing   Skin: warm   Neurological: no focal   Musculoskeletal: move all ext     I&O's Detail    30 Apr 2024 07:01  -  01 May 2024 07:00  --------------------------------------------------------  IN:  Total IN: 0 mL    OUT:    Voided (mL): 300 mL  Total OUT: 300 mL    Total NET: -300 mL          LABS:                          13.1   9.87  )-----------( 307      ( 30 Apr 2024 05:58 )             39.2         30 Apr 2024 05:58    140    |  103    |  19     ----------------------------<  165    3.7     |  23     |  0.7      Ca    9.2        30 Apr 2024 05:58  Phos  3.0       30 Apr 2024 05:58  Mg     1.8       30 Apr 2024 05:58                                                                                      Urinalysis Basic - ( 30 Apr 2024 05:58 )    Color: x / Appearance: x / SG: x / pH: x  Gluc: 165 mg/dL / Ketone: x  / Bili: x / Urobili: x   Blood: x / Protein: x / Nitrite: x   Leuk Esterase: x / RBC: x / WBC x   Sq Epi: x / Non Sq Epi: x / Bacteria: x                                                                                                                                                     MEDICATIONS  (STANDING):  albuterol/ipratropium for Nebulization 3 milliLiter(s) Nebulizer every 4 hours  aspirin enteric coated 81 milliGRAM(s) Oral daily  atorvastatin 40 milliGRAM(s) Oral at bedtime  azithromycin  IVPB 500 milliGRAM(s) IV Intermittent every 24 hours  azithromycin  IVPB      carvedilol 3.125 milliGRAM(s) Oral every 12 hours  chlorhexidine 2% Cloths 1 Application(s) Topical <User Schedule>  heparin   Injectable 5000 Unit(s) SubCutaneous every 8 hours  lisinopril 40 milliGRAM(s) Oral daily  methylPREDNISolone sodium succinate Injectable 40 milliGRAM(s) IV Push every 12 hours  pantoprazole    Tablet 40 milliGRAM(s) Oral before breakfast    MEDICATIONS  (PRN):  albuterol/ipratropium for Nebulization 3 milliLiter(s) Nebulizer every 6 hours PRN Shortness of Breath and/or Wheezing          Xrays:  TLC:  OG:  ET tube:                                                                                       ECHO:  [facility] ICU:          
KAITY CASTRO 73y Female  MRN#: [id number]   Hospital Day:     SUBJECTIVE  Patient is a 73y old Female who presents with a chief complaint of COPD exacerbation w/ elevated troponins (30 Apr 2024 12:37)  Currently admitted to medicine with the primary diagnosis of NSTEMI (non-ST elevation myocardial infarction)    INTERVAL HPI AND OVERNIGHT EVENTS:  Patient was examined and seen at bedside. This morning she is resting comfortably in bed and reports no issues or overnight events. Denies any chest pain or SOB.       OBJECTIVE  PAST MEDICAL & SURGICAL HISTORY  COPD exacerbation    COVID-19    Tobacco user    HTN (hypertension)    CVA (cerebral vascular accident)    No significant past surgical history      ALLERGIES:  penicillin (Unknown)    MEDICATIONS:  STANDING MEDICATIONS  albuterol/ipratropium for Nebulization 3 milliLiter(s) Nebulizer every 4 hours  aspirin enteric coated 81 milliGRAM(s) Oral daily  azithromycin  IVPB      chlorhexidine 2% Cloths 1 Application(s) Topical <User Schedule>  heparin   Injectable 5000 Unit(s) SubCutaneous every 8 hours  lisinopril 40 milliGRAM(s) Oral daily  methylPREDNISolone sodium succinate Injectable 40 milliGRAM(s) IV Push every 12 hours  metoprolol tartrate 12.5 milliGRAM(s) Oral every 12 hours  pantoprazole    Tablet 40 milliGRAM(s) Oral before breakfast    PRN MEDICATIONS  albuterol/ipratropium for Nebulization 3 milliLiter(s) Nebulizer every 6 hours PRN      VITAL SIGNS: Last 24 Hours  T(C): 36.1 (30 Apr 2024 07:01), Max: 36.1 (30 Apr 2024 07:01)  T(F): 97 (30 Apr 2024 07:01), Max: 97 (30 Apr 2024 07:01)  HR: 94 (30 Apr 2024 07:03) (94 - 134)  BP: 107/70 (30 Apr 2024 07:03) (89/62 - 175/103)  BP(mean): 84 (30 Apr 2024 07:03) (84 - 94)  RR: 27 (30 Apr 2024 07:03) (15 - 34)  SpO2: 91% (30 Apr 2024 07:03) (86% - 96%)    LABS:                        13.1   9.87  )-----------( 307      ( 30 Apr 2024 05:58 )             39.2     04-30    140  |  103  |  19  ----------------------------<  165<H>  3.7   |  23  |  0.7    Ca    9.2      30 Apr 2024 05:58  Phos  3.0     04-30  Mg     1.8     04-30    TPro  6.3  /  Alb  3.9  /  TBili  0.2  /  DBili  x   /  AST  10  /  ALT  6   /  AlkPhos  113  04-30      Urinalysis Basic - ( 30 Apr 2024 05:58 )    Color: x / Appearance: x / SG: x / pH: x  Gluc: 165 mg/dL / Ketone: x  / Bili: x / Urobili: x   Blood: x / Protein: x / Nitrite: x   Leuk Esterase: x / RBC: x / WBC x   Sq Epi: x / Non Sq Epi: x / Bacteria: x    PHYSICAL EXAM:  CONSTITUTIONAL: No acute distress, on NC  PULMONARY: decreased breath sounds bilaterally  CARDIOVASCULAR: Regular rate and rhythm  GASTROINTESTINAL: Soft, non-tender, non-distended; bowel sounds present  MUSCULOSKELETAL: no edema  NEUROLOGY: non-focal  
Patient is a 73y old  Female who presents with a chief complaint of COPD exacerbation w/ elevated troponins (30 Apr 2024 10:00)      OVERNIGHT EVENTS: doing well on 3 LNC     SUBJECTIVE / INTERVAL HPI: Patient seen and examined at bedside.     VITAL SIGNS:  Vital Signs Last 24 Hrs  T(C): 36.1 (30 Apr 2024 07:01), Max: 36.1 (30 Apr 2024 07:01)  T(F): 97 (30 Apr 2024 07:01), Max: 97 (30 Apr 2024 07:01)  HR: 94 (30 Apr 2024 07:03) (94 - 134)  BP: 107/70 (30 Apr 2024 07:03) (89/62 - 175/103)  BP(mean): 84 (30 Apr 2024 07:03) (84 - 94)  RR: 27 (30 Apr 2024 07:03) (15 - 34)  SpO2: 91% (30 Apr 2024 07:03) (86% - 96%)    Parameters below as of 30 Apr 2024 07:03  Patient On (Oxygen Delivery Method): nasal cannula  O2 Flow (L/min): 3      PHYSICAL EXAM:    General: old lady chronically looks ill   HEENT: NC/AT; PERRL, clear conjunctiva  Neck: supple  Cardiovascular: +S1/S2; RRR  Respiratory: dec air entry b/l w scattered exp wheezing  Gastrointestinal: soft, NT/ND; +BSx4  Extremities: WWP; 2+ peripheral pulses; no edema   Neurological: AAOx3; no focal deficits    MEDICATIONS:  MEDICATIONS  (STANDING):  albuterol/ipratropium for Nebulization 3 milliLiter(s) Nebulizer every 4 hours  aspirin enteric coated 81 milliGRAM(s) Oral daily  azithromycin  IVPB      chlorhexidine 2% Cloths 1 Application(s) Topical <User Schedule>  heparin   Injectable 5000 Unit(s) SubCutaneous every 8 hours  lisinopril 40 milliGRAM(s) Oral daily  methylPREDNISolone sodium succinate Injectable 40 milliGRAM(s) IV Push every 12 hours  metoprolol tartrate 12.5 milliGRAM(s) Oral every 12 hours  pantoprazole    Tablet 40 milliGRAM(s) Oral before breakfast  sodium chloride 0.9%. 1000 milliLiter(s) (75 mL/Hr) IV Continuous <Continuous>    MEDICATIONS  (PRN):  albuterol/ipratropium for Nebulization 3 milliLiter(s) Nebulizer every 6 hours PRN Shortness of Breath and/or Wheezing      ALLERGIES:  Allergies    penicillin (Unknown)    Intolerances        LABS:                        13.1   9.87  )-----------( 307      ( 30 Apr 2024 05:58 )             39.2     04-30    140  |  103  |  19  ----------------------------<  165<H>  3.7   |  23  |  0.7    Ca    9.2      30 Apr 2024 05:58  Phos  3.0     04-30  Mg     1.8     04-30    TPro  6.3  /  Alb  3.9  /  TBili  0.2  /  DBili  x   /  AST  10  /  ALT  6   /  AlkPhos  113  04-30      Urinalysis Basic - ( 30 Apr 2024 05:58 )    Color: x / Appearance: x / SG: x / pH: x  Gluc: 165 mg/dL / Ketone: x  / Bili: x / Urobili: x   Blood: x / Protein: x / Nitrite: x   Leuk Esterase: x / RBC: x / WBC x   Sq Epi: x / Non Sq Epi: x / Bacteria: x      CAPILLARY BLOOD GLUCOSE          RADIOLOGY & ADDITIONAL TESTS: Reviewed.    ASSESSMENT:    PLAN:

## 2024-05-01 NOTE — DISCHARGE NOTE PROVIDER - HOSPITAL COURSE
72 yo W female w/ PMH of COPD, CVA, dementia, COVID , tobacco use.  Pt comes to ER c/o progressive SOB.  Additionally pt w/ elevated troponin. pt  on adm was slightly tachypneic &  tachycardic . EKG unremarkable.  Pt's active  chronic tobacco user( smokes 2 cigarettes/day)  Pt denied : CP, n/v,  Pt for adm to ICU stepdown.    Pt was admitted for acute hypoxic and hypercapnic respiratory failure secondary to COPD exacerbation and elevated troponin. She was started on duoneb treatment, azithromycin, solumedrol and clinically improved. She is now on 3LNC and on ambulation her oxygen drops to 88% so she will be discharged home with oxygen, completion of zithromax, prednisone taper as well as adding spiriva to her regimen. In terms of her elevated troponins, her troponins peaked at 99, downtrended since. ECG on admission showed atrial tachycardia and so she was started on metoprolol. Echo showed preserved EF with no wall motion abnormalities. She will get a lexiscan done with cardio outpatient.      Pt is medically stable to be discharged home today.

## 2024-05-01 NOTE — DISCHARGE NOTE PROVIDER - PROVIDER TOKENS
PROVIDER:[TOKEN:[[id number]],FOLLOWUP:[1 week]],PROVIDER:[TOKEN:[[id number]],FOLLOWUP:[1 week]],PROVIDER:[TOKEN:[[id number]],FOLLOWUP:[1 week]]

## 2024-05-01 NOTE — DISCHARGE NOTE NURSING/CASE MANAGEMENT/SOCIAL WORK - PATIENT PORTAL LINK FT
You can access the FollowMyHealth Patient Portal offered by [facility] by registering at the following website: [email or web address]. By joining [facility]’s FollowMyHealth portal, you will also be able to view your health information using other applications (apps) compatible with our system.

## 2024-05-01 NOTE — PROGRESS NOTE ADULT - REASON FOR ADMISSION
COPD exacerbation w/ elevated troponins
COPD exacerbation w/ elevated troponin
COPD exacerbation w/ elevated troponins
COPD exacerbation w/ elevated troponins

## 2024-05-01 NOTE — DISCHARGE NOTE NURSING/CASE MANAGEMENT/SOCIAL WORK - NSDCPEEMAIL_GEN_ALL_CORE
[facility] for Tobacco Control email [email or web address]

## 2024-05-01 NOTE — DISCHARGE NOTE PROVIDER - CARE PROVIDER_API CALL
Jefferson Monteiro  Critical Care Medicine  [address] [place] [address]  Phone: [phone]  Fax: [phone]  Follow Up Time: 1 week    Alberto Ibarra  Cardiology  [address] [place] [address]  Phone: [phone]  Fax: [phone]  Follow Up Time: 1 week    Susy Odonnell  Internal Medicine  [address] [place] [address]  Phone: [phone]  Fax: [phone]  Follow Up Time: 1 week

## 2024-05-01 NOTE — PHYSICAL THERAPY INITIAL EVALUATION ADULT - ADDITIONAL COMMENTS
pt lives with her  and her daughter's family in a private house with stair lift out and inside.  Pt was able to amb with RW and assistance and verbal cues

## 2024-05-01 NOTE — PROGRESS NOTE ADULT - ASSESSMENT
IMPRESSION:  copd exacerbation   elevated trop       PLAN:    CNS: no sedation     HEENT: oral care     PULMONARY: keep pox >92%   can switch steroids to prednisone 40 mg daily for 5 days then 20 mg for 4 days   nebulizer Q4 hrs and prn   upon d/c patient need to be on laba/inhaled steroids /lama , patient was on andvair can add spiriva   check pox on RA rest and exertion ??need for home oxygen   smoking cessation   outpatient follow up     CARDIOVASCULAR: follow    cardiology follow up   echo     GI: GI prophylaxis.  Feeding      RENAL: follow lytes replace Mg     INFECTIOUS DISEASE: azithromycin 5 days total     HEMATOLOGICAL:  DVT prophylaxis.    ENDOCRINE:  Follow up FS.  Insulin protocol if needed.    disposition as per cardiology   from pulmonary can be downgrade to floor   recall prn         CRITICAL CARE TIME SPENT: ***
Pt is a 74 yo F, with PMHx of COPD not on home O2, CVA, smoker, HTN, presented for SOB and currently admitted for COPD exacerbation and r/o ACS.    #acute hypoxic and hypercapnic respiratory failure secondary to COPD exacerbation  - was placed on NRB in the ED  - pCO2 66 on VBG  - now currently on nasal cannula  - decrease solumedrol 60mg q8 to q12  - continue with nebulizer q4hrs and prn  - check ambulatory pulse ox- will likely need to be dc home with oxygen  - azithromycin x 5 days  - check procalc  - on d/c pt will need LABA+ inhaled steroids + LAMA    #elevated troponins, likely NSTEMI type II  - troponin 56 >99>83  - ECG on admission: atrial tachycardia  - LDL: 77  - pt now rate controlled  - evaluated by cardiology  - pending echo  - started on aspirin, metoprolol and statin  - trend troponins  - stat ECG if pt endorses chest pain    #hx of CVA  #HTN  - continue with statin  - continue with metoprolol and lisinopril    #Misc:  - DVT ppx: heparin  - GI ppx: protonix  - Diet: DASH  - activity: IAT  - Dispo: SDU    #Pending: procalc, echo -> cardio Follow up, troponins, ambulatory pulse ox, tapering steroids  
72 yo female PMHx sig for Alzheimer's dementia, CVA, COPD.  Brought in by patient's daughter for sob.     #acute hypoxemic rs failure  #COPD exacerbation  #NSTEMI karen type 2    PLANs:    - on 3 LNC, wean off as tolerated, assess need for home o2 in AM  - c/w methylpred 40mg bid, nebs, Azithromycin D1  - will need laba/inhaled steroids /lama on DC  - trop rising, check TTE, reports no CP, trend trop, repeat EKG, cardio following   - dvt ppx  - spent 55 mins evaluating pt and coordinating care   - daughter updated at bedside
74 yo female with a medical Hx of  Alzheimer's dementia, CVA, COPD.  Brought in by patient's daughter for sob.  Pt still smokes  1-2 cigs/day    COPD exacerbation / NSTEMI     - medically stable for DC home on oral prednisone taper    - Advair and Spiriva   - complete 5 days of Azithromycin   - aspirin, Lipitor and metoprolol   - DC smoking   - check ambulating pox   - outpt cardio / pulm f/u   - 35 min spent on pt care today

## 2024-05-01 NOTE — DISCHARGE NOTE PROVIDER - NSCORESITESY/N_GEN_A_CORE_RD
Problem: Pressure Injury, Risk for  Goal: # Skin remains intact  Outcome: Outcome Met, Continue evaluating goal progress toward completion     Problem: VTE, Risk for  Goal: # No s/s of VTE  Outcome: Outcome Met, Complete Goal     Problem: At Risk for Falls  Goal: # Patient does not fall  Outcome: Outcome Met, Continue evaluating goal progress toward completion      No

## 2024-05-01 NOTE — CHART NOTE - NSCHARTNOTEFT_GEN_A_CORE
Pt's daughter is requesting to take pt home right now.   I explained to her that we are still waiting for oxygen to be delivered to her house, and after speaking to the , the delivery will be expedited.  Daughter states that she does not want to wait any longer and that she currently has a portable and if she's not discharged, she will sign AMA.   Spoke to attending, ok to discharge her.

## 2024-05-01 NOTE — DISCHARGE NOTE PROVIDER - NSDCMRMEDTOKEN_GEN_ALL_CORE_FT
Advair Diskus 500 mcg-50 mcg inhalation powder: 1 inhaled 2 times a day  Albuterol (Eqv-Proventil HFA) 90 mcg/inh inhalation aerosol: 2 puff(s) inhaled every 6 hours as needed for  shortness of breath and/or wheezing  Aspir 81 oral delayed release tablet: 1 tab(s) orally once a day  atorvastatin 40 mg oral tablet: 1 tab(s) orally once a day (at bedtime)  azithromycin 250 mg oral tablet: 1 tab(s) orally once a day  D3 125 mcg/0.5 mL (5000 intl units/0.5 mL) oral liquid: 0.5 milliliter(s) orally  lisinopril 40 mg oral tablet: 1 tab(s) orally once a day  predniSONE 20 mg oral tablet: 2 tab(s) orally once a day take 2 tabs (40mg) x 5 days (5/2-5/6) and then take 1 tab (20mg) x 5 days (5/7- 5/11)  tiotropium 2.5 mcg/inh inhalation aerosol: 2 puff(s) inhaled once a day  Toprol-XL 25 mg oral tablet, extended release: 1 tab(s) orally once a day   Advair Diskus 500 mcg-50 mcg inhalation powder: 1 inhaled 2 times a day  Albuterol (Eqv-Proventil HFA) 90 mcg/inh inhalation aerosol: 2 puff(s) inhaled every 6 hours as needed for  shortness of breath and/or wheezing  Aspir 81 oral delayed release tablet: 1 tab(s) orally once a day  atorvastatin 40 mg oral tablet: 1 tab(s) orally once a day (at bedtime)  azithromycin 250 mg oral tablet: 1 tab(s) orally once a day  Coreg 6.25 mg oral tablet: 1 tab(s) orally 2 times a day  D3 125 mcg/0.5 mL (5000 intl units/0.5 mL) oral liquid: 0.5 milliliter(s) orally  lisinopril 40 mg oral tablet: 1 tab(s) orally once a day  predniSONE 20 mg oral tablet: 2 tab(s) orally once a day take 2 tabs (40mg) x 5 days (5/2-5/6) and then take 1 tab (20mg) x 5 days (5/7- 5/11)  tiotropium 2.5 mcg/inh inhalation aerosol: 2 puff(s) inhaled once a day

## 2024-05-02 ENCOUNTER — TRANSCRIPTION ENCOUNTER (OUTPATIENT)
Age: 74
End: 2024-05-02

## 2024-05-02 ENCOUNTER — INPATIENT (INPATIENT)
Facility: HOSPITAL | Age: 74
LOS: 0 days | Discharge: HOME HEALTH | DRG: 189 | End: 2024-05-02
Attending: INTERNAL MEDICINE | Admitting: FAMILY MEDICINE
Payer: MEDICARE

## 2024-05-02 VITALS
WEIGHT: 139.99 LBS | OXYGEN SATURATION: 90 % | TEMPERATURE: 98 F | SYSTOLIC BLOOD PRESSURE: 183 MMHG | HEART RATE: 140 BPM | HEIGHT: 65 IN | RESPIRATION RATE: 22 BRPM | DIASTOLIC BLOOD PRESSURE: 137 MMHG

## 2024-05-02 VITALS
OXYGEN SATURATION: 96 % | RESPIRATION RATE: 19 BRPM | DIASTOLIC BLOOD PRESSURE: 61 MMHG | SYSTOLIC BLOOD PRESSURE: 108 MMHG | HEART RATE: 105 BPM

## 2024-05-02 DIAGNOSIS — J96.01 ACUTE RESPIRATORY FAILURE WITH HYPOXIA: ICD-10-CM

## 2024-05-02 PROBLEM — U07.1 COVID-19: Chronic | Status: ACTIVE | Noted: 2024-04-30

## 2024-05-02 PROBLEM — I10 ESSENTIAL (PRIMARY) HYPERTENSION: Chronic | Status: ACTIVE | Noted: 2024-04-30

## 2024-05-02 PROBLEM — Z72.0 TOBACCO USE: Chronic | Status: ACTIVE | Noted: 2024-04-30

## 2024-05-02 PROBLEM — I63.9 CEREBRAL INFARCTION, UNSPECIFIED: Chronic | Status: ACTIVE | Noted: 2024-04-30

## 2024-05-02 LAB
ALBUMIN SERPL ELPH-MCNC: 4.4 G/DL — SIGNIFICANT CHANGE UP (ref 3.5–5.2)
ALP SERPL-CCNC: 122 U/L — HIGH (ref 30–115)
ALT FLD-CCNC: 9 U/L — SIGNIFICANT CHANGE UP (ref 0–41)
ANION GAP SERPL CALC-SCNC: 13 MMOL/L — SIGNIFICANT CHANGE UP (ref 7–14)
ANION GAP SERPL CALC-SCNC: 13 MMOL/L — SIGNIFICANT CHANGE UP (ref 7–14)
AST SERPL-CCNC: 22 U/L — SIGNIFICANT CHANGE UP (ref 0–41)
BASE EXCESS BLDV CALC-SCNC: 2.4 MMOL/L — SIGNIFICANT CHANGE UP (ref -2–3)
BASOPHILS # BLD AUTO: 0.07 K/UL — SIGNIFICANT CHANGE UP (ref 0–0.2)
BASOPHILS NFR BLD AUTO: 0.3 % — SIGNIFICANT CHANGE UP (ref 0–1)
BILIRUB SERPL-MCNC: 0.2 MG/DL — SIGNIFICANT CHANGE UP (ref 0.2–1.2)
BUN SERPL-MCNC: 18 MG/DL — SIGNIFICANT CHANGE UP (ref 10–20)
BUN SERPL-MCNC: 19 MG/DL — SIGNIFICANT CHANGE UP (ref 10–20)
CA-I SERPL-SCNC: 1.15 MMOL/L — SIGNIFICANT CHANGE UP (ref 1.15–1.33)
CALCIUM SERPL-MCNC: 9.1 MG/DL — SIGNIFICANT CHANGE UP (ref 8.4–10.5)
CALCIUM SERPL-MCNC: 9.1 MG/DL — SIGNIFICANT CHANGE UP (ref 8.4–10.5)
CHLORIDE SERPL-SCNC: 101 MMOL/L — SIGNIFICANT CHANGE UP (ref 98–110)
CHLORIDE SERPL-SCNC: 102 MMOL/L — SIGNIFICANT CHANGE UP (ref 98–110)
CO2 SERPL-SCNC: 26 MMOL/L — SIGNIFICANT CHANGE UP (ref 17–32)
CO2 SERPL-SCNC: 26 MMOL/L — SIGNIFICANT CHANGE UP (ref 17–32)
CREAT SERPL-MCNC: 0.6 MG/DL — LOW (ref 0.7–1.5)
CREAT SERPL-MCNC: 0.7 MG/DL — SIGNIFICANT CHANGE UP (ref 0.7–1.5)
EGFR: 90 ML/MIN/1.73M2 — SIGNIFICANT CHANGE UP
EGFR: 94 ML/MIN/1.73M2 — SIGNIFICANT CHANGE UP
EOSINOPHIL # BLD AUTO: 0.39 K/UL — SIGNIFICANT CHANGE UP (ref 0–0.7)
EOSINOPHIL NFR BLD AUTO: 1.6 % — SIGNIFICANT CHANGE UP (ref 0–8)
GAS PNL BLDV: 135 MMOL/L — LOW (ref 136–145)
GAS PNL BLDV: SIGNIFICANT CHANGE UP
GLUCOSE SERPL-MCNC: 198 MG/DL — HIGH (ref 70–99)
GLUCOSE SERPL-MCNC: 229 MG/DL — HIGH (ref 70–99)
HCO3 BLDV-SCNC: 29 MMOL/L — SIGNIFICANT CHANGE UP (ref 22–29)
HCT VFR BLD CALC: 39.1 % — SIGNIFICANT CHANGE UP (ref 37–47)
HCT VFR BLD CALC: 45.2 % — SIGNIFICANT CHANGE UP (ref 37–47)
HCT VFR BLDA CALC: 44 % — SIGNIFICANT CHANGE UP (ref 34.5–46.5)
HGB BLD CALC-MCNC: 14.8 G/DL — SIGNIFICANT CHANGE UP (ref 11.7–16.1)
HGB BLD-MCNC: 12.7 G/DL — SIGNIFICANT CHANGE UP (ref 12–16)
HGB BLD-MCNC: 14.5 G/DL — SIGNIFICANT CHANGE UP (ref 12–16)
IMM GRANULOCYTES NFR BLD AUTO: 0.5 % — HIGH (ref 0.1–0.3)
LACTATE BLDV-MCNC: 2.1 MMOL/L — HIGH (ref 0.5–2)
LACTATE SERPL-SCNC: 2.2 MMOL/L — HIGH (ref 0.7–2)
LACTATE SERPL-SCNC: 5 MMOL/L — CRITICAL HIGH (ref 0.7–2)
LYMPHOCYTES # BLD AUTO: 1.53 K/UL — SIGNIFICANT CHANGE UP (ref 1.2–3.4)
LYMPHOCYTES # BLD AUTO: 6.3 % — LOW (ref 20.5–51.1)
MAGNESIUM SERPL-MCNC: 1.9 MG/DL — SIGNIFICANT CHANGE UP (ref 1.8–2.4)
MAGNESIUM SERPL-MCNC: 2.5 MG/DL — HIGH (ref 1.8–2.4)
MCHC RBC-ENTMCNC: 26.4 PG — LOW (ref 27–31)
MCHC RBC-ENTMCNC: 27 PG — SIGNIFICANT CHANGE UP (ref 27–31)
MCHC RBC-ENTMCNC: 32.1 G/DL — SIGNIFICANT CHANGE UP (ref 32–37)
MCHC RBC-ENTMCNC: 32.5 G/DL — SIGNIFICANT CHANGE UP (ref 32–37)
MCV RBC AUTO: 82.2 FL — SIGNIFICANT CHANGE UP (ref 81–99)
MCV RBC AUTO: 83 FL — SIGNIFICANT CHANGE UP (ref 81–99)
MONOCYTES # BLD AUTO: 1.42 K/UL — HIGH (ref 0.1–0.6)
MONOCYTES NFR BLD AUTO: 5.9 % — SIGNIFICANT CHANGE UP (ref 1.7–9.3)
NEUTROPHILS # BLD AUTO: 20.72 K/UL — HIGH (ref 1.4–6.5)
NEUTROPHILS NFR BLD AUTO: 85.4 % — HIGH (ref 42.2–75.2)
NRBC # BLD: 0 /100 WBCS — SIGNIFICANT CHANGE UP (ref 0–0)
NRBC # BLD: 0 /100 WBCS — SIGNIFICANT CHANGE UP (ref 0–0)
NT-PROBNP SERPL-SCNC: 2075 PG/ML — HIGH (ref 0–300)
PCO2 BLDV: 53 MMHG — HIGH (ref 39–42)
PH BLDV: 7.35 — SIGNIFICANT CHANGE UP (ref 7.32–7.43)
PLATELET # BLD AUTO: 324 K/UL — SIGNIFICANT CHANGE UP (ref 130–400)
PLATELET # BLD AUTO: 389 K/UL — SIGNIFICANT CHANGE UP (ref 130–400)
PMV BLD: 10.6 FL — HIGH (ref 7.4–10.4)
PMV BLD: 10.9 FL — HIGH (ref 7.4–10.4)
PO2 BLDV: 60 MMHG — HIGH (ref 25–45)
POTASSIUM BLDV-SCNC: 4.7 MMOL/L — SIGNIFICANT CHANGE UP (ref 3.5–5.1)
POTASSIUM SERPL-MCNC: 4.2 MMOL/L — SIGNIFICANT CHANGE UP (ref 3.5–5)
POTASSIUM SERPL-MCNC: 5.8 MMOL/L — HIGH (ref 3.5–5)
POTASSIUM SERPL-SCNC: 4.2 MMOL/L — SIGNIFICANT CHANGE UP (ref 3.5–5)
POTASSIUM SERPL-SCNC: 5.8 MMOL/L — HIGH (ref 3.5–5)
PROCALCITONIN SERPL-MCNC: 0.08 NG/ML — SIGNIFICANT CHANGE UP (ref 0.02–0.1)
PROT SERPL-MCNC: 7.8 G/DL — SIGNIFICANT CHANGE UP (ref 6–8)
RBC # BLD: 4.71 M/UL — SIGNIFICANT CHANGE UP (ref 4.2–5.4)
RBC # BLD: 5.5 M/UL — HIGH (ref 4.2–5.4)
RBC # FLD: 14.4 % — SIGNIFICANT CHANGE UP (ref 11.5–14.5)
RBC # FLD: 14.5 % — SIGNIFICANT CHANGE UP (ref 11.5–14.5)
SAO2 % BLDV: 91.6 % — HIGH (ref 67–88)
SODIUM SERPL-SCNC: 140 MMOL/L — SIGNIFICANT CHANGE UP (ref 135–146)
SODIUM SERPL-SCNC: 141 MMOL/L — SIGNIFICANT CHANGE UP (ref 135–146)
TROPONIN T, HIGH SENSITIVITY RESULT: 43 NG/L — HIGH (ref 6–13)
TROPONIN T, HIGH SENSITIVITY RESULT: 65 NG/L — CRITICAL HIGH (ref 6–13)
WBC # BLD: 14 K/UL — HIGH (ref 4.8–10.8)
WBC # BLD: 24.26 K/UL — HIGH (ref 4.8–10.8)
WBC # FLD AUTO: 14 K/UL — HIGH (ref 4.8–10.8)
WBC # FLD AUTO: 24.26 K/UL — HIGH (ref 4.8–10.8)

## 2024-05-02 PROCEDURE — 84145 PROCALCITONIN (PCT): CPT

## 2024-05-02 PROCEDURE — 80048 BASIC METABOLIC PNL TOTAL CA: CPT

## 2024-05-02 PROCEDURE — 83735 ASSAY OF MAGNESIUM: CPT

## 2024-05-02 PROCEDURE — 84484 ASSAY OF TROPONIN QUANT: CPT

## 2024-05-02 PROCEDURE — 99291 CRITICAL CARE FIRST HOUR: CPT

## 2024-05-02 PROCEDURE — 71045 X-RAY EXAM CHEST 1 VIEW: CPT | Mod: 26

## 2024-05-02 PROCEDURE — 85027 COMPLETE CBC AUTOMATED: CPT

## 2024-05-02 PROCEDURE — 36415 COLL VENOUS BLD VENIPUNCTURE: CPT

## 2024-05-02 PROCEDURE — 99222 1ST HOSP IP/OBS MODERATE 55: CPT

## 2024-05-02 PROCEDURE — 99233 SBSQ HOSP IP/OBS HIGH 50: CPT

## 2024-05-02 PROCEDURE — 83605 ASSAY OF LACTIC ACID: CPT

## 2024-05-02 RX ORDER — LISINOPRIL 2.5 MG/1
40 TABLET ORAL DAILY
Refills: 0 | Status: DISCONTINUED | OUTPATIENT
Start: 2024-05-02 | End: 2024-05-02

## 2024-05-02 RX ORDER — EPINEPHRINE 0.3 MG/.3ML
0.3 INJECTION INTRAMUSCULAR; SUBCUTANEOUS ONCE
Refills: 0 | Status: COMPLETED | OUTPATIENT
Start: 2024-05-02 | End: 2024-05-02

## 2024-05-02 RX ORDER — PANTOPRAZOLE SODIUM 20 MG/1
40 TABLET, DELAYED RELEASE ORAL
Refills: 0 | Status: DISCONTINUED | OUTPATIENT
Start: 2024-05-02 | End: 2024-05-02

## 2024-05-02 RX ORDER — ASPIRIN/CALCIUM CARB/MAGNESIUM 324 MG
81 TABLET ORAL DAILY
Refills: 0 | Status: DISCONTINUED | OUTPATIENT
Start: 2024-05-02 | End: 2024-05-02

## 2024-05-02 RX ORDER — CHLORHEXIDINE GLUCONATE 213 G/1000ML
1 SOLUTION TOPICAL
Refills: 0 | Status: DISCONTINUED | OUTPATIENT
Start: 2024-05-02 | End: 2024-05-02

## 2024-05-02 RX ORDER — MAGNESIUM SULFATE 500 MG/ML
2 VIAL (ML) INJECTION ONCE
Refills: 0 | Status: COMPLETED | OUTPATIENT
Start: 2024-05-02 | End: 2024-05-02

## 2024-05-02 RX ORDER — IPRATROPIUM/ALBUTEROL SULFATE 18-103MCG
3 AEROSOL WITH ADAPTER (GRAM) INHALATION ONCE
Refills: 0 | Status: COMPLETED | OUTPATIENT
Start: 2024-05-02 | End: 2024-05-02

## 2024-05-02 RX ORDER — LISINOPRIL 2.5 MG/1
1 TABLET ORAL
Refills: 0 | DISCHARGE

## 2024-05-02 RX ORDER — HEPARIN SODIUM 5000 [USP'U]/ML
5000 INJECTION INTRAVENOUS; SUBCUTANEOUS EVERY 8 HOURS
Refills: 0 | Status: DISCONTINUED | OUTPATIENT
Start: 2024-05-02 | End: 2024-05-02

## 2024-05-02 RX ORDER — ALBUTEROL 90 UG/1
2 AEROSOL, METERED ORAL EVERY 4 HOURS
Refills: 0 | Status: DISCONTINUED | OUTPATIENT
Start: 2024-05-02 | End: 2024-05-02

## 2024-05-02 RX ORDER — ATORVASTATIN CALCIUM 80 MG/1
40 TABLET, FILM COATED ORAL AT BEDTIME
Refills: 0 | Status: DISCONTINUED | OUTPATIENT
Start: 2024-05-02 | End: 2024-05-02

## 2024-05-02 RX ORDER — AZITHROMYCIN 500 MG/1
500 TABLET, FILM COATED ORAL EVERY 24 HOURS
Refills: 0 | Status: DISCONTINUED | OUTPATIENT
Start: 2024-05-02 | End: 2024-05-02

## 2024-05-02 RX ORDER — DIPHENHYDRAMINE HCL 50 MG
50 CAPSULE ORAL ONCE
Refills: 0 | Status: COMPLETED | OUTPATIENT
Start: 2024-05-02 | End: 2024-05-02

## 2024-05-02 RX ORDER — ALBUTEROL 90 UG/1
2.5 AEROSOL, METERED ORAL
Refills: 0 | Status: COMPLETED | OUTPATIENT
Start: 2024-05-02 | End: 2024-05-02

## 2024-05-02 RX ORDER — IPRATROPIUM/ALBUTEROL SULFATE 18-103MCG
3 AEROSOL WITH ADAPTER (GRAM) INHALATION EVERY 6 HOURS
Refills: 0 | Status: DISCONTINUED | OUTPATIENT
Start: 2024-05-02 | End: 2024-05-02

## 2024-05-02 RX ORDER — FAMOTIDINE 10 MG/ML
20 INJECTION INTRAVENOUS ONCE
Refills: 0 | Status: COMPLETED | OUTPATIENT
Start: 2024-05-02 | End: 2024-05-02

## 2024-05-02 RX ADMIN — FAMOTIDINE 20 MILLIGRAM(S): 10 INJECTION INTRAVENOUS at 02:45

## 2024-05-02 RX ADMIN — Medication 150 GRAM(S): at 04:12

## 2024-05-02 RX ADMIN — Medication 3 MILLILITER(S): at 08:36

## 2024-05-02 RX ADMIN — Medication 3 MILLILITER(S): at 02:47

## 2024-05-02 RX ADMIN — Medication 3 MILLILITER(S): at 02:36

## 2024-05-02 RX ADMIN — EPINEPHRINE 0.3 MILLIGRAM(S): 0.3 INJECTION INTRAMUSCULAR; SUBCUTANEOUS at 02:30

## 2024-05-02 RX ADMIN — Medication 50 MILLIGRAM(S): at 02:45

## 2024-05-02 RX ADMIN — Medication 40 MILLIGRAM(S): at 06:15

## 2024-05-02 RX ADMIN — ALBUTEROL 2.5 MILLIGRAM(S): 90 AEROSOL, METERED ORAL at 04:26

## 2024-05-02 RX ADMIN — HEPARIN SODIUM 5000 UNIT(S): 5000 INJECTION INTRAVENOUS; SUBCUTANEOUS at 06:15

## 2024-05-02 RX ADMIN — Medication 3 MILLILITER(S): at 02:25

## 2024-05-02 RX ADMIN — ALBUTEROL 2.5 MILLIGRAM(S): 90 AEROSOL, METERED ORAL at 04:47

## 2024-05-02 RX ADMIN — CHLORHEXIDINE GLUCONATE 1 APPLICATION(S): 213 SOLUTION TOPICAL at 06:16

## 2024-05-02 RX ADMIN — Medication 125 MILLIGRAM(S): at 02:31

## 2024-05-02 RX ADMIN — AZITHROMYCIN 255 MILLIGRAM(S): 500 TABLET, FILM COATED ORAL at 10:23

## 2024-05-02 RX ADMIN — ALBUTEROL 2.5 MILLIGRAM(S): 90 AEROSOL, METERED ORAL at 04:13

## 2024-05-02 NOTE — DISCHARGE NOTE NURSING/CASE MANAGEMENT/SOCIAL WORK - NSDCPEWEB_GEN_ALL_CORE
[facility] for Tobacco Control website --- [email or web address] --- [email or web address]

## 2024-05-02 NOTE — DISCHARGE NOTE PROVIDER - NSDCCPCAREPLAN_GEN_ALL_CORE_FT
PRINCIPAL DISCHARGE DIAGNOSIS  Diagnosis: Acute hypoxic respiratory failure  Assessment and Plan of Treatment: You were brought in because of a possible allergic reaction to one of the medications. You were treated with steroids, epinephrine and placed on bipap to help with your breathing. Your son would like to take you home against medical advice. All the risks of leaving now were explained to your son. Please followup closely with your cardiologist and pulmonologist and PCP upon discharge. You will be discharged against medical advice.      SECONDARY DISCHARGE DIAGNOSES  Diagnosis: Anaphylaxis  Assessment and Plan of Treatment:

## 2024-05-02 NOTE — PATIENT PROFILE ADULT - FALL HARM RISK - HARM RISK INTERVENTIONS

## 2024-05-02 NOTE — H&P ADULT - HISTORY OF PRESENT ILLNESS
pt is a 74 yo F with history of dementia, HTN, CVA, COPD discharged today on home O2 after episode of hypoxic respiratory failure 2/2 COPD exacerbation with NSTEMI BIBEMS for respiratory distress and rash. Patient's daughter states that she was doing well upon discharge, using her O2, ate dinner which was pizza and then shortly after had sudden respiratory distress and turned bright red. Patient's daughter tried to use albuterol at home but there was no improvement. Daughters states pt took first dose of Carvedilol today, formerly was on metoprolol but had to stop due to a reaction which included respiratory distress.   On arrival to ER  patient hypoxic to 90% on NC 3L, tachycardic, tachypneic with poor air entry, diffuse erythematous rash/flushing to chest, arms, neck. No angioedema noted. Received solumedrol, epi and placed on bipap with improvement. Denies fever, chills, cp, n/v/d, abdominal pain.

## 2024-05-02 NOTE — DISCHARGE NOTE NURSING/CASE MANAGEMENT/SOCIAL WORK - NSDCPEEMAIL_GEN_ALL_CORE
7/22/20 [facility]   NO PRECERT REQUIRED - PATIENT HAS MEDICARE AND E-VERIFIED  -  PER Epic -  [facility] for Tobacco Control email [email or web address]

## 2024-05-02 NOTE — PROGRESS NOTE ADULT - ASSESSMENT
IMPRESSION:  acute resp failure secondary to ?? allergic reaction doubt secondary to copd exacerbation   ?? Rll PNA   copd       PLAN:    CNS: no sedation     HEENT: oral care     PULMONARY: keep pox >92%   taper off NIV   albuterol Q4 hrs and prn   continue solumedrol    smoking cessation   outpatient follow up     CARDIOVASCULAR: follow    cardiology follow up     GI: GI prophylaxis.  Feeding      RENAL: follow lytes replace Mg     INFECTIOUS DISEASE: check procal , nasal mrsa   add azactam for now   cxr maximus   finish azithromycin 5 days total     HEMATOLOGICAL:  DVT prophylaxis.    ENDOCRINE:  Follow up FS.  Insulin protocol if needed.    SDU         CRITICAL CARE TIME SPENT: ***

## 2024-05-02 NOTE — H&P ADULT - NSHPPHYSICALEXAM_GEN_ALL_CORE
ICU Vital Signs Last 24 Hrs  T(C): 36.8 (02 May 2024 02:48), Max: 36.8 (02 May 2024 02:40)  T(F): 98.2 (02 May 2024 02:48), Max: 98.2 (02 May 2024 02:40)  HR: 110 (02 May 2024 04:12) (89 - 140)  BP: 108/58 (02 May 2024 04:12) (108/58 - 183/137)  BP(mean): 93 (01 May 2024 08:25) (93 - 93)  ABP: --  ABP(mean): --  RR: 22 (02 May 2024 04:12) (22 - 30)  SpO2: 98% (02 May 2024 04:12) (88% - 100%)    O2 Parameters below as of 02 May 2024 04:12  Patient On (Oxygen Delivery Method): BiPAP/CPAP    Constitutional: NAD, well-nourished, non toxic appearing   HEENT: Airway patent, moist MM, no erythema/swelling/deformity of oral structures. EOMI, PERRLA.  CV: regular rate, regular rhythm, well-perfused extremities, 2+ b/l DP and radial pulses equal.  Lungs: + wheeze, no increased WOB on bipap   ABD: NTND, no guarding or rebound, no pulsatile mass, no hernias.   MSK: Neck supple, nontender, nl ROM, no stepoff. Chest nontender. Back nontender in TLS spine or to b/l bony structures or flanks. Ext nontender, nl rom, no deformity.   INTEG: Skin warm, dry, no rash.  Neuro: awake and alert

## 2024-05-02 NOTE — H&P ADULT - NSHPLABSRESULTS_GEN_ALL_CORE
14.5   24.26 )-----------( 389      ( 02 May 2024 02:35 )             45.2     05-02    140  |  101  |  18  ----------------------------<  198<H>  5.8<H>   |  26  |  0.6<L>    Ca    9.1      02 May 2024 02:35  Phos  3.0     04-30  Mg     1.9     05-02    TPro  7.8  /  Alb  4.4  /  TBili  0.2  /  DBili  x   /  AST  22  /  ALT  9   /  AlkPhos  122<H>  05-02    Urinalysis Basic - ( 02 May 2024 02:35 )    Color: x / Appearance: x / SG: x / pH: x  Gluc: 198 mg/dL / Ketone: x  / Bili: x / Urobili: x   Blood: x / Protein: x / Nitrite: x   Leuk Esterase: x / RBC: x / WBC x   Sq Epi: x / Non Sq Epi: x / Bacteria: x      < from: Xray Chest 1 View- PORTABLE-Routine (Xray Chest 1 View- PORTABLE-Routine in AM.) (05.01.24 @ 07:55) >    Impression:    No focal pulmonary consolidation    < end of copied text >

## 2024-05-02 NOTE — ED PROVIDER NOTE - OBJECTIVE STATEMENT
74 yo F with history of COPD discharged today on home O2 after episode of hypoxic respiratory failure with NSTEMI, CVA, dementia BIBEMS or dyspnea/respiratory distress and rash. Patient's daughter states that she was doing well upon discharge, using O2, ate dinner which was pizza and then shortly after had sudden respiratory distress and turned bright red.     Notes food is not new for her but does note that she just took first dose of Carvedilol today, formerly was on metoprolol but had to stop due to a reaction which included respiratory distress.     Patient's daughter tried to use albuterol at home but there was no improvement.     On arrival to ED patient hypoxic to 90% on NC 3L, tachycardic, tachypneic with poor air entry, diffuse erythematous rash to chest, arms, neck.    Patient had no vomiting, itching, eye redness/swelling, angioedema.

## 2024-05-02 NOTE — DISCHARGE NOTE PROVIDER - CARE PROVIDER_API CALL
Alberto Ibarra  Cardiology  [address] [place] [id number]  Phone: [phone]  Fax: [phone]  Follow Up Time: 1 week

## 2024-05-02 NOTE — PATIENT PROFILE ADULT - NSTRANSFERBELONGINGSDISPO_GEN_A_NUR
with patient Unna Boot Text: An Unna boot was placed to help immobilize the limb and facilitate more rapid healing.

## 2024-05-02 NOTE — PROGRESS NOTE ADULT - SUBJECTIVE AND OBJECTIVE BOX
I spoke with the pt and pts son Armando at bedside regarding the risks of leaving the hospital against medical advice including serious illness and even death. They still wish to leave AMA
Patient is a 73y old  Female who presents with a chief complaint of     Over Night Events:  Patient seen and examined.   chart reviewed   currently on bipap comfortable not on distress   H&P reviewed     ROS:  See HPI    PHYSICAL EXAM    ICU Vital Signs Last 24 Hrs  T(C): 36.8 (02 May 2024 02:48), Max: 36.8 (02 May 2024 02:40)  T(F): 98.2 (02 May 2024 02:48), Max: 98.2 (02 May 2024 02:40)  HR: 111 (02 May 2024 06:03) (89 - 140)  BP: 107/64 (02 May 2024 06:03) (107/64 - 183/137)  BP(mean): 79 (02 May 2024 06:03) (79 - 93)  ABP: --  ABP(mean): --  RR: 36 (02 May 2024 06:03) (22 - 36)  SpO2: 97% (02 May 2024 06:03) (88% - 100%)    O2 Parameters below as of 02 May 2024 04:12  Patient On (Oxygen Delivery Method): BiPAP/CPAP            General:awake   HEENT:       rafa         Lymph Nodes: NO cervical LN   Lungs: Bilateral BS  Cardiovascular: Regular   Abdomen: Soft, Positive BS  Extremities: No clubbing   Skin: warm   Neurological: no focal   Musculoskeletal: move all ext     I&O's Detail      LABS:                          14.5   24.26 )-----------( 389      ( 02 May 2024 02:35 )             45.2         02 May 2024 02:35    140    |  101    |  18     ----------------------------<  198    5.8     |  26     |  0.6      Ca    9.1        02 May 2024 02:35  Mg     1.9       02 May 2024 02:35    TPro  7.8    /  Alb  4.4    /  TBili  0.2    /  DBili  x      /  AST  22     /  ALT  9      /  AlkPhos  122    02 May 2024 02:35  Amylase x     lipase x                                                                                        Urinalysis Basic - ( 02 May 2024 02:35 )    Color: x / Appearance: x / SG: x / pH: x  Gluc: 198 mg/dL / Ketone: x  / Bili: x / Urobili: x   Blood: x / Protein: x / Nitrite: x   Leuk Esterase: x / RBC: x / WBC x   Sq Epi: x / Non Sq Epi: x / Bacteria: x                                                                                                                                                     MEDICATIONS  (STANDING):  albuterol/ipratropium for Nebulization 3 milliLiter(s) Nebulizer every 6 hours  aspirin enteric coated 81 milliGRAM(s) Oral daily  atorvastatin 40 milliGRAM(s) Oral at bedtime  chlorhexidine 2% Cloths 1 Application(s) Topical <User Schedule>  heparin   Injectable 5000 Unit(s) SubCutaneous every 8 hours  methylPREDNISolone sodium succinate Injectable 40 milliGRAM(s) IV Push every 8 hours  pantoprazole    Tablet 40 milliGRAM(s) Oral before breakfast    MEDICATIONS  (PRN):  albuterol    90 MICROgram(s) HFA Inhaler 2 Puff(s) Inhalation every 4 hours PRN Bronchospasm          Xrays:  TLC:  OG:  ET tube:                                                                                    ?? Rll opacity    ECHO:  [facility] ICU:

## 2024-05-02 NOTE — H&P ADULT - ASSESSMENT
72 yo F with history of dementia, HTN, CVA, COPD discharged today on home O2 after episode of hypoxic respiratory failure 2/2 COPD exacerbation with NSTEMI BIBEMS for respiratory distress.    #acute hypoxic and hypercapnic respiratory 2/2 anaphylaxis v COPD/asthma exacerbation 2/2 betablocker   - placed on bipap, wean as tolerated    - c/w solumedrol   - continue with nebulizer q4hrs and prn  - pulmonary consult     #elevated troponins, recent NSTEMI type II  - troponin 83 > 45 > 65   - repeat in am   - ECG     # elevated BNP  - BNP 2000 (200 on 4/30)    - recent echo 4/30:  EF of 54 %  - repeat echo, r/o new HF 2/2  NSTEMI    # h/o HTN  - continue lisinopril, hold carvedilol/betablockers     - DVT ppx: heparin  - GI ppx: protonix  - Diet: DASH  - activity: IAT  - Dispo: SDU  - Full Code          74 yo F with history of dementia, HTN, CVA, COPD discharged today on home O2 after episode of hypoxic respiratory failure 2/2 COPD exacerbation with NSTEMI BIBEMS for respiratory distress.    #acute hypoxic respiratory failure 2/2 anaphylaxis v COPD/asthma exacerbation 2/2 betablocker   - placed on bipap, wean as tolerated    - c/w solumedrol   - continue with nebulizer q4hrs and prn  - pulmonary consult     #elevated troponins, recent NSTEMI type II  - troponin 83 > 45 > 65   - repeat in am   - ECG     # elevated BNP  - ?new HF 2/2 recent NSTEMI  - BNP 2000 (200 on 4/30)    - recent echo 4/30:  EF of 54 %    # h/o HTN  - continue lisinopril, hold carvedilol/betablockers     #H/o CVA   - c/w statin     - DVT ppx: heparin  - GI ppx: protonix  - Diet: DASH  - activity: IAT  - Dispo: SDU  - Full Code

## 2024-05-02 NOTE — DISCHARGE NOTE NURSING/CASE MANAGEMENT/SOCIAL WORK - PATIENT PORTAL LINK FT
You can access the FollowMyHealth Patient Portal offered by  by registering at the following website: [email or web address]. By joining [employer]’s FollowMyHealth portal, you will also be able to view your health information using other applications (apps) compatible with our system.

## 2024-05-02 NOTE — H&P ADULT - NS ATTEND AMEND GEN_ALL_CORE FT
74 yo F with history of dementia, HTN, CVA, COPD discharged today on home O2 after episode of hypoxic respiratory failure 2/2 COPD exacerbation with NSTEMI BIBEMS for respiratory distress.    #acute hypoxic respiratory failure 2/2 anaphylaxis v COPD/asthma exacerbation 2/2 betablocker   - placed on bipap, wean as tolerated    - c/w solumedrol   - continue with nebulizer q4hrs and prn  - pulmonary consult     #elevated troponins, recent NSTEMI type II  - troponin 83 > 45 > 65   - repeat in am   - ECG     # elevated BNP  - ?new HF 2/2 recent NSTEMI  - BNP 2000 (200 on 4/30)    - recent echo 4/30:  EF of 54 %    # h/o HTN  - continue lisinopril, hold carvedilol/betablockers     #H/o CVA   - c/w statin     - DVT ppx: heparin  - GI ppx: protonix  - Diet: DASH  - activity: IAT  - Dispo: SDU  - Full Code

## 2024-05-02 NOTE — ED PROVIDER NOTE - CRITICAL CARE ATTENDING CONTRIBUTION TO CARE
74 yo F with history of COPD discharged today on home O2 after episode of hypoxic respiratory failure with NSTEMI, CVA, dementia BIBEMS or dyspnea/respiratory distress and rash. Patient's daughter states that she was doing well upon discharge, using O2, ate dinner which was pizza and then shortly after had sudden respiratory distress and turned bright red.     Notes food is not new for her but does note that she just took first dose of Carvedilol today, formerly was on metoprolol but had to stop due to a reaction which included respiratory distress.     Patient's daughter tried to use albuterol at home but there was no improvement.     On arrival to ED patient hypoxic to 90% on NC 3L, tachycardic, tachypneic with poor air entry, diffuse erythematous rash to chest, arms, neck.    Patient had no vomiting, itching, eye redness/swelling, angioedema.    Patient doing much better on BiPAP, after epi, nebs, steroid, mag.     Discussed case with Dr. Lamb of ICU, accepts patient to SDU.    Suspect acute hypoxic respiratory failure due to COPD exacerbation vs anaphylaxis. Of note, patient has BNP of 2000, was only 200 2 days ago. This could represent new HF given recent NSTEMI which was likely type 2 ischemia from previous, severe COPD exacerbation.    Discussed goals of care with daughter, patient is full code.

## 2024-05-02 NOTE — DISCHARGE NOTE NURSING/CASE MANAGEMENT/SOCIAL WORK - NSDCPEFALRISK_GEN_ALL_CORE
For information on Fall & Injury Prevention, visit: [email or web address] OR  [email or web address] OR  https://www.cdc.gov/steadi/patient.html

## 2024-05-02 NOTE — ED PROVIDER NOTE - CLINICAL SUMMARY MEDICAL DECISION MAKING FREE TEXT BOX
Patient doing much better on BiPAP, after epi, nebs, steroid, mag.     Discussed case with Dr. Lamb of ICU, accepts patient to SDU.    Suspect acute hypoxic respiratory failure due to COPD exacerbation vs anaphylaxis. Of note, patient has BNP of 2000, was only 200 2 days ago. This could represent new HF given recent NSTEMI which was likely type 2 ischemia from previous, severe COPD exacerbation.    Discussed goals of care with daughter, patient is full code.

## 2024-05-02 NOTE — DISCHARGE NOTE PROVIDER - HOSPITAL COURSE
Pt is a 72 yo F with history of dementia, HTN, CVA, COPD discharged today on home O2 after episode of hypoxic respiratory failure 2/2 COPD exacerbation with NSTEMI BIBEMS for respiratory distress and rash. Patient's daughter states that she was doing well upon discharge, using her O2, ate dinner which was pizza and then shortly after had sudden respiratory distress and turned bright red. Patient's daughter tried to use albuterol at home but there was no improvement. Daughters states pt took first dose of Carvedilol today, formerly was on metoprolol but had to stop due to a reaction which included respiratory distress.   On arrival to ER  patient hypoxic to 90% on NC 3L, tachycardic, tachypneic with poor air entry, diffuse erythematous rash/flushing to chest, arms, neck. No angioedema noted. Received solumedrol, epi and placed on bipap with improvement. Denies fever, chills, cp, n/v/d, abdominal pain.     Pt was admitted to SDU for further monitoring. She was continued on treatment with steroids, azithromycin to complete prior course and duonebs treatment. She was taken off BiPAP onto nasal cannula.     Pt's family (son) was very frustrated given all the events that happened and wanted to take his mother home AMA. Attending spoke with son at bedside and explained to him all the risks of leaving against medical advice. Son is still adamant about taking his mother home AMA.     Pt will be discharged AMA.

## 2024-05-02 NOTE — DISCHARGE NOTE PROVIDER - CARE PROVIDERS DIRECT ADDRESSES
[email or web address]

## 2024-05-02 NOTE — ED PROVIDER NOTE - PHYSICAL EXAMINATION
VITAL SIGNS: I have reviewed nursing notes and confirm.  CONSTITUTIONAL: chronically ill, tachypneic  SKIN: Skin exam is warm and dry, diffuse rash to chest, arms, red but not raised, appears petechial in some areas/on arms  HEAD: Normocephalic; atraumatic.  EYES: PERRL, EOM intact; conjunctiva and sclera clear.  ENT: No nasal discharge; airway clear.   CARD: tachycardia  RESP: poor air entry, + repsiratory distress, occasional wheezing  ABD: Normal bowel sounds; soft; non-distended; non-tender  EXT: Normal ROM, no LE edema  NEURO: Alert, oriented. Grossly unremarkable. No focal deficits.  PSYCH: Cooperative, appropriate.

## 2024-05-02 NOTE — DISCHARGE NOTE PROVIDER - NSDCMRMEDTOKEN_GEN_ALL_CORE_FT
Advair Diskus 500 mcg-50 mcg inhalation powder: 1 inhaled 2 times a day  Albuterol (Eqv-Proventil HFA) 90 mcg/inh inhalation aerosol: 2 puff(s) inhaled every 6 hours as needed for  shortness of breath and/or wheezing  Aspir 81 oral delayed release tablet: 1 tab(s) orally once a day  atorvastatin 40 mg oral tablet: 1 tab(s) orally once a day (at bedtime)  azithromycin 250 mg oral tablet: 1 tab(s) orally once a day  D3 125 mcg/0.5 mL (5000 intl units/0.5 mL) oral liquid: 0.5 milliliter(s) orally  predniSONE 20 mg oral tablet: 2 tab(s) orally once a day take 2 tabs (40mg) x 5 days (5/2-5/6) and then take 1 tab (20mg) x 5 days (5/7- 5/11)  tiotropium 2.5 mcg/inh inhalation aerosol: 2 puff(s) inhaled once a day

## 2024-05-08 DIAGNOSIS — F17.200 NICOTINE DEPENDENCE, UNSPECIFIED, UNCOMPLICATED: ICD-10-CM

## 2024-05-08 DIAGNOSIS — Z86.73 PERSONAL HISTORY OF TRANSIENT ISCHEMIC ATTACK (TIA), AND CEREBRAL INFARCTION WITHOUT RESIDUAL DEFICITS: ICD-10-CM

## 2024-05-08 DIAGNOSIS — T50.995A ADVERSE EFFECT OF OTHER DRUGS, MEDICAMENTS AND BIOLOGICAL SUBSTANCES, INITIAL ENCOUNTER: ICD-10-CM

## 2024-05-08 DIAGNOSIS — I10 ESSENTIAL (PRIMARY) HYPERTENSION: ICD-10-CM

## 2024-05-08 DIAGNOSIS — Z88.0 ALLERGY STATUS TO PENICILLIN: ICD-10-CM

## 2024-05-08 DIAGNOSIS — J44.1 CHRONIC OBSTRUCTIVE PULMONARY DISEASE WITH (ACUTE) EXACERBATION: ICD-10-CM

## 2024-05-08 DIAGNOSIS — J96.01 ACUTE RESPIRATORY FAILURE WITH HYPOXIA: ICD-10-CM

## 2024-05-08 DIAGNOSIS — I21.A1 MYOCARDIAL INFARCTION TYPE 2: ICD-10-CM

## 2024-05-08 DIAGNOSIS — Z86.16 PERSONAL HISTORY OF COVID-19: ICD-10-CM
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
## 2024-05-08 DIAGNOSIS — I21.4 NON-ST ELEVATION (NSTEMI) MYOCARDIAL INFARCTION: ICD-10-CM

## 2024-05-08 DIAGNOSIS — Z79.51 LONG TERM (CURRENT) USE OF INHALED STEROIDS: ICD-10-CM

## 2024-05-08 DIAGNOSIS — F03.90 UNSPECIFIED DEMENTIA, UNSPECIFIED SEVERITY, WITHOUT BEHAVIORAL DISTURBANCE, PSYCHOTIC DISTURBANCE, MOOD DISTURBANCE, AND ANXIETY: ICD-10-CM

## 2024-05-08 DIAGNOSIS — R21 RASH AND OTHER NONSPECIFIC SKIN ERUPTION: ICD-10-CM

## 2024-05-08 DIAGNOSIS — G30.9 ALZHEIMER'S DISEASE, UNSPECIFIED: ICD-10-CM

## 2024-05-08 DIAGNOSIS — J96.02 ACUTE RESPIRATORY FAILURE WITH HYPERCAPNIA: ICD-10-CM

## 2024-05-08 DIAGNOSIS — F02.80 DEMENTIA IN OTHER DISEASES CLASSIFIED ELSEWHERE, UNSPECIFIED SEVERITY, WITHOUT BEHAVIORAL DISTURBANCE, PSYCHOTIC DISTURBANCE, MOOD DISTURBANCE, AND ANXIETY: ICD-10-CM

## 2024-05-08 DIAGNOSIS — Z79.82 LONG TERM (CURRENT) USE OF ASPIRIN: ICD-10-CM

## 2024-10-23 ENCOUNTER — INPATIENT (INPATIENT)
Facility: HOSPITAL | Age: 74
LOS: 0 days | Discharge: LEFT BEFORE DISCHARGE | DRG: 872 | End: 2024-10-24
Attending: STUDENT IN AN ORGANIZED HEALTH CARE EDUCATION/TRAINING PROGRAM | Admitting: INTERNAL MEDICINE
Payer: MEDICARE

## 2024-10-23 VITALS
WEIGHT: 125 LBS | OXYGEN SATURATION: 92 % | TEMPERATURE: 100 F | SYSTOLIC BLOOD PRESSURE: 136 MMHG | HEART RATE: 147 BPM | DIASTOLIC BLOOD PRESSURE: 88 MMHG | RESPIRATION RATE: 18 BRPM

## 2024-10-23 DIAGNOSIS — A41.9 SEPSIS, UNSPECIFIED ORGANISM: ICD-10-CM

## 2024-10-23 LAB
ALBUMIN SERPL ELPH-MCNC: 3.9 G/DL — SIGNIFICANT CHANGE UP (ref 3.5–5.2)
ALP SERPL-CCNC: 146 U/L — HIGH (ref 30–115)
ALT FLD-CCNC: 13 U/L — SIGNIFICANT CHANGE UP (ref 0–41)
ANION GAP SERPL CALC-SCNC: 11 MMOL/L — SIGNIFICANT CHANGE UP (ref 7–14)
APPEARANCE UR: ABNORMAL
AST SERPL-CCNC: 14 U/L — SIGNIFICANT CHANGE UP (ref 0–41)
BACTERIA # UR AUTO: ABNORMAL /HPF
BASE EXCESS BLDV CALC-SCNC: 3.2 MMOL/L — HIGH (ref -2–3)
BASOPHILS # BLD AUTO: 0.03 K/UL — SIGNIFICANT CHANGE UP (ref 0–0.2)
BASOPHILS NFR BLD AUTO: 0.2 % — SIGNIFICANT CHANGE UP (ref 0–1)
BILIRUB SERPL-MCNC: 0.4 MG/DL — SIGNIFICANT CHANGE UP (ref 0.2–1.2)
BILIRUB UR-MCNC: NEGATIVE — SIGNIFICANT CHANGE UP
BUN SERPL-MCNC: 18 MG/DL — SIGNIFICANT CHANGE UP (ref 10–20)
CA-I SERPL-SCNC: 1.21 MMOL/L — SIGNIFICANT CHANGE UP (ref 1.15–1.33)
CALCIUM SERPL-MCNC: 9.3 MG/DL — SIGNIFICANT CHANGE UP (ref 8.4–10.5)
CHLORIDE SERPL-SCNC: 97 MMOL/L — LOW (ref 98–110)
CO2 SERPL-SCNC: 29 MMOL/L — SIGNIFICANT CHANGE UP (ref 17–32)
COLOR SPEC: SIGNIFICANT CHANGE UP
CREAT SERPL-MCNC: 0.7 MG/DL — SIGNIFICANT CHANGE UP (ref 0.7–1.5)
DIFF PNL FLD: ABNORMAL
EGFR: 90 ML/MIN/1.73M2 — SIGNIFICANT CHANGE UP
EOSINOPHIL # BLD AUTO: 0.03 K/UL — SIGNIFICANT CHANGE UP (ref 0–0.7)
EOSINOPHIL NFR BLD AUTO: 0.2 % — SIGNIFICANT CHANGE UP (ref 0–8)
EPI CELLS # UR: SIGNIFICANT CHANGE UP
FLUAV AG NPH QL: SIGNIFICANT CHANGE UP
FLUBV AG NPH QL: SIGNIFICANT CHANGE UP
GAS PNL BLDV: 135 MMOL/L — LOW (ref 136–145)
GAS PNL BLDV: SIGNIFICANT CHANGE UP
GLUCOSE BLDC GLUCOMTR-MCNC: 150 MG/DL — HIGH (ref 70–99)
GLUCOSE SERPL-MCNC: 203 MG/DL — HIGH (ref 70–99)
GLUCOSE UR QL: NEGATIVE MG/DL — SIGNIFICANT CHANGE UP
HCO3 BLDV-SCNC: 31 MMOL/L — HIGH (ref 22–29)
HCT VFR BLD CALC: 41.8 % — SIGNIFICANT CHANGE UP (ref 37–47)
HCT VFR BLDA CALC: 40 % — SIGNIFICANT CHANGE UP (ref 34.5–46.5)
HGB BLD CALC-MCNC: 13.3 G/DL — SIGNIFICANT CHANGE UP (ref 11.7–16.1)
HGB BLD-MCNC: 13.2 G/DL — SIGNIFICANT CHANGE UP (ref 12–16)
IMM GRANULOCYTES NFR BLD AUTO: 0.8 % — HIGH (ref 0.1–0.3)
KETONES UR-MCNC: NEGATIVE MG/DL — SIGNIFICANT CHANGE UP
LACTATE BLDV-MCNC: 3.2 MMOL/L — HIGH (ref 0.5–2)
LACTATE SERPL-SCNC: 3.3 MMOL/L — HIGH (ref 0.7–2)
LEUKOCYTE ESTERASE UR-ACNC: ABNORMAL
LIDOCAIN IGE QN: 13 U/L — SIGNIFICANT CHANGE UP (ref 7–60)
LYMPHOCYTES # BLD AUTO: 1.18 K/UL — LOW (ref 1.2–3.4)
LYMPHOCYTES # BLD AUTO: 6.3 % — LOW (ref 20.5–51.1)
MCHC RBC-ENTMCNC: 26.6 PG — LOW (ref 27–31)
MCHC RBC-ENTMCNC: 31.6 G/DL — LOW (ref 32–37)
MCV RBC AUTO: 84.3 FL — SIGNIFICANT CHANGE UP (ref 81–99)
MONOCYTES # BLD AUTO: 0.56 K/UL — SIGNIFICANT CHANGE UP (ref 0.1–0.6)
MONOCYTES NFR BLD AUTO: 3 % — SIGNIFICANT CHANGE UP (ref 1.7–9.3)
NEUTROPHILS # BLD AUTO: 16.65 K/UL — HIGH (ref 1.4–6.5)
NEUTROPHILS NFR BLD AUTO: 89.5 % — HIGH (ref 42.2–75.2)
NITRITE UR-MCNC: POSITIVE
NRBC # BLD: 0 /100 WBCS — SIGNIFICANT CHANGE UP (ref 0–0)
NT-PROBNP SERPL-SCNC: 324 PG/ML — HIGH (ref 0–300)
PCO2 BLDV: 60 MMHG — HIGH (ref 39–42)
PH BLDV: 7.32 — SIGNIFICANT CHANGE UP (ref 7.32–7.43)
PH UR: 6 — SIGNIFICANT CHANGE UP (ref 5–8)
PLATELET # BLD AUTO: 355 K/UL — SIGNIFICANT CHANGE UP (ref 130–400)
PMV BLD: 9.7 FL — SIGNIFICANT CHANGE UP (ref 7.4–10.4)
PO2 BLDV: 34 MMHG — SIGNIFICANT CHANGE UP (ref 25–45)
POTASSIUM BLDV-SCNC: 3.7 MMOL/L — SIGNIFICANT CHANGE UP (ref 3.5–5.1)
POTASSIUM SERPL-MCNC: 4 MMOL/L — SIGNIFICANT CHANGE UP (ref 3.5–5)
POTASSIUM SERPL-SCNC: 4 MMOL/L — SIGNIFICANT CHANGE UP (ref 3.5–5)
PROT SERPL-MCNC: 6.8 G/DL — SIGNIFICANT CHANGE UP (ref 6–8)
PROT UR-MCNC: 30 MG/DL
RBC # BLD: 4.96 M/UL — SIGNIFICANT CHANGE UP (ref 4.2–5.4)
RBC # FLD: 14.1 % — SIGNIFICANT CHANGE UP (ref 11.5–14.5)
RBC CASTS # UR COMP ASSIST: 10 /HPF — HIGH (ref 0–4)
RSV RNA NPH QL NAA+NON-PROBE: SIGNIFICANT CHANGE UP
SAO2 % BLDV: 53.7 % — LOW (ref 67–88)
SARS-COV-2 RNA SPEC QL NAA+PROBE: SIGNIFICANT CHANGE UP
SODIUM SERPL-SCNC: 137 MMOL/L — SIGNIFICANT CHANGE UP (ref 135–146)
SP GR SPEC: >1.03 — HIGH (ref 1–1.03)
TROPONIN T, HIGH SENSITIVITY RESULT: <6 NG/L — SIGNIFICANT CHANGE UP (ref 6–13)
TROPONIN T, HIGH SENSITIVITY RESULT: <6 NG/L — SIGNIFICANT CHANGE UP (ref 6–13)
UROBILINOGEN FLD QL: 0.2 MG/DL — SIGNIFICANT CHANGE UP (ref 0.2–1)
WBC # BLD: 18.59 K/UL — HIGH (ref 4.8–10.8)
WBC # FLD AUTO: 18.59 K/UL — HIGH (ref 4.8–10.8)
WBC UR QL: ABNORMAL /HPF (ref 0–5)

## 2024-10-23 PROCEDURE — 99285 EMERGENCY DEPT VISIT HI MDM: CPT

## 2024-10-23 PROCEDURE — 94660 CPAP INITIATION&MGMT: CPT

## 2024-10-23 PROCEDURE — 82962 GLUCOSE BLOOD TEST: CPT

## 2024-10-23 PROCEDURE — 99222 1ST HOSP IP/OBS MODERATE 55: CPT

## 2024-10-23 PROCEDURE — 83605 ASSAY OF LACTIC ACID: CPT

## 2024-10-23 PROCEDURE — 85025 COMPLETE CBC W/AUTO DIFF WBC: CPT

## 2024-10-23 PROCEDURE — 36415 COLL VENOUS BLD VENIPUNCTURE: CPT

## 2024-10-23 PROCEDURE — 80053 COMPREHEN METABOLIC PANEL: CPT

## 2024-10-23 PROCEDURE — 93010 ELECTROCARDIOGRAM REPORT: CPT

## 2024-10-23 PROCEDURE — 74177 CT ABD & PELVIS W/CONTRAST: CPT | Mod: 26,MC

## 2024-10-23 PROCEDURE — 71045 X-RAY EXAM CHEST 1 VIEW: CPT | Mod: 26

## 2024-10-23 RX ORDER — LISINOPRIL 40 MG
40 TABLET ORAL DAILY
Refills: 0 | Status: DISCONTINUED | OUTPATIENT
Start: 2024-10-23 | End: 2024-10-24

## 2024-10-23 RX ORDER — ASPIRIN/MAG CARB/ALUMINUM AMIN 325 MG
81 TABLET ORAL DAILY
Refills: 0 | Status: DISCONTINUED | OUTPATIENT
Start: 2024-10-23 | End: 2024-10-24

## 2024-10-23 RX ORDER — METHYLPREDNISOLONE ACETATE 80 MG/ML
40 INJECTION, SUSPENSION INTRALESIONAL; INTRAMUSCULAR; INTRASYNOVIAL; SOFT TISSUE EVERY 8 HOURS
Refills: 0 | Status: DISCONTINUED | OUTPATIENT
Start: 2024-10-23 | End: 2024-10-24

## 2024-10-23 RX ORDER — ONDANSETRON HYDROCHLORIDE 2 MG/ML
4 INJECTION, SOLUTION INTRAMUSCULAR; INTRAVENOUS EVERY 8 HOURS
Refills: 0 | Status: DISCONTINUED | OUTPATIENT
Start: 2024-10-23 | End: 2024-10-24

## 2024-10-23 RX ORDER — CEFEPIME 2 G/1
1000 INJECTION, POWDER, FOR SOLUTION INTRAVENOUS EVERY 12 HOURS
Refills: 0 | Status: DISCONTINUED | OUTPATIENT
Start: 2024-10-23 | End: 2024-10-24

## 2024-10-23 RX ORDER — MAGNESIUM, ALUMINUM HYDROXIDE 200-200 MG
30 TABLET,CHEWABLE ORAL EVERY 4 HOURS
Refills: 0 | Status: DISCONTINUED | OUTPATIENT
Start: 2024-10-23 | End: 2024-10-24

## 2024-10-23 RX ORDER — PANTOPRAZOLE SODIUM 40 MG/1
40 TABLET, DELAYED RELEASE ORAL
Refills: 0 | Status: DISCONTINUED | OUTPATIENT
Start: 2024-10-23 | End: 2024-10-24

## 2024-10-23 RX ORDER — ESOMEPRAZOLE MAGNESIUM 40 MG/1
1 CAPSULE, DELAYED RELEASE ORAL
Refills: 0 | DISCHARGE

## 2024-10-23 RX ORDER — ACETAMINOPHEN 500 MG
650 TABLET ORAL ONCE
Refills: 0 | Status: DISCONTINUED | OUTPATIENT
Start: 2024-10-23 | End: 2024-10-23

## 2024-10-23 RX ORDER — CEFEPIME 2 G/1
1000 INJECTION, POWDER, FOR SOLUTION INTRAVENOUS ONCE
Refills: 0 | Status: COMPLETED | OUTPATIENT
Start: 2024-10-23 | End: 2024-10-23

## 2024-10-23 RX ORDER — SODIUM CHLORIDE 9 MG/ML
1000 INJECTION, SOLUTION INTRAMUSCULAR; INTRAVENOUS; SUBCUTANEOUS
Refills: 0 | Status: DISCONTINUED | OUTPATIENT
Start: 2024-10-23 | End: 2024-10-24

## 2024-10-23 RX ORDER — ACETAMINOPHEN 500 MG
650 TABLET ORAL ONCE
Refills: 0 | Status: COMPLETED | OUTPATIENT
Start: 2024-10-23 | End: 2024-10-23

## 2024-10-23 RX ORDER — ACETAMINOPHEN 500 MG
650 TABLET ORAL EVERY 6 HOURS
Refills: 0 | Status: DISCONTINUED | OUTPATIENT
Start: 2024-10-23 | End: 2024-10-24

## 2024-10-23 RX ORDER — LISINOPRIL 40 MG
1 TABLET ORAL
Refills: 0 | DISCHARGE

## 2024-10-23 RX ORDER — ALBUTEROL 90 MCG
2 AEROSOL (GRAM) INHALATION EVERY 6 HOURS
Refills: 0 | Status: DISCONTINUED | OUTPATIENT
Start: 2024-10-23 | End: 2024-10-24

## 2024-10-23 RX ORDER — SODIUM CHLORIDE 9 MG/ML
1750 INJECTION, SOLUTION INTRAMUSCULAR; INTRAVENOUS; SUBCUTANEOUS ONCE
Refills: 0 | Status: COMPLETED | OUTPATIENT
Start: 2024-10-23 | End: 2024-10-23

## 2024-10-23 RX ORDER — IPRATROPIUM BROMIDE AND ALBUTEROL SULFATE .5; 2.5 MG/3ML; MG/3ML
3 SOLUTION RESPIRATORY (INHALATION) ONCE
Refills: 0 | Status: COMPLETED | OUTPATIENT
Start: 2024-10-23 | End: 2024-10-23

## 2024-10-23 RX ADMIN — SODIUM CHLORIDE 1750 MILLILITER(S): 9 INJECTION, SOLUTION INTRAMUSCULAR; INTRAVENOUS; SUBCUTANEOUS at 20:29

## 2024-10-23 RX ADMIN — SODIUM CHLORIDE 1750 MILLILITER(S): 9 INJECTION, SOLUTION INTRAMUSCULAR; INTRAVENOUS; SUBCUTANEOUS at 18:25

## 2024-10-23 RX ADMIN — SODIUM CHLORIDE 75 MILLILITER(S): 9 INJECTION, SOLUTION INTRAMUSCULAR; INTRAVENOUS; SUBCUTANEOUS at 23:46

## 2024-10-23 RX ADMIN — CEFEPIME 100 MILLIGRAM(S): 2 INJECTION, POWDER, FOR SOLUTION INTRAVENOUS at 18:23

## 2024-10-23 RX ADMIN — Medication 650 MILLIGRAM(S): at 18:20

## 2024-10-23 RX ADMIN — IPRATROPIUM BROMIDE AND ALBUTEROL SULFATE 3 MILLILITER(S): .5; 2.5 SOLUTION RESPIRATORY (INHALATION) at 18:01

## 2024-10-23 NOTE — H&P ADULT - NSHPLABSRESULTS_GEN_ALL_CORE
LABS:  cret                        13.2   18.59 )-----------( 355      ( 23 Oct 2024 17:50 )             41.8     10-23    137  |  97[L]  |  18  ----------------------------<  203[H]  4.0   |  29  |  0.7    Ca    9.3      23 Oct 2024 17:50    TPro  6.8  /  Alb  3.9  /  TBili  0.4  /  DBili  x   /  AST  14  /  ALT  13  /  AlkPhos  146[H]  10-23      RADIOLOGY:     CT Abdomen and Pelvis w/ IV Cont (10.23.24 @ 19:46)     IMPRESSION:    No evidence of acute abdominal or pelvic pathology    Multiple severe age-indeterminate compression fractures with   vertebroplasty changes of the L4 vertebra. Osteopenia        Xray Chest 1 View- PORTABLE-Urgent (10.23.24 @ 19:29)     IMPRESSION:    No radiographic evidence of acute cardiopulmonary disease.

## 2024-10-23 NOTE — H&P ADULT - NSHPSOCIALHISTORY_GEN_ALL_CORE
Former smoker, no drug or ETOH use  Lives with daughter Former smoker 1.5 PPD x >40 years, quit 5/2024  no drug or ETOH use  Lives with daughter

## 2024-10-23 NOTE — ED PROVIDER NOTE - CLINICAL SUMMARY MEDICAL DECISION MAKING FREE TEXT BOX
Patient with positive rectal temp.  Patient with increased work of breathing and required BiPAP.  Patient improved.  Heart rate improved.  Patient treated with IV fluids as well as antipyretics.  Antibiotics were initiated.  Patient with elevated white blood cell count.  Chest x-ray from was interpreted by me.  No acute opacity seen.  CT Solimon.  No acute findings.  Urinalysis is positive for UTI.  Family made aware.  Will admit at this time.

## 2024-10-23 NOTE — ED PROVIDER NOTE - OBJECTIVE STATEMENT
74-year-old female presents to the emergency room by EMS.  Patient's daughter states that patient was weak and vomiting today.  Patient has a long history of COPD and is normally short of breath as per the daughter.  Patient's daughter is concerned that patient may have an infection causing this weakness and vomiting

## 2024-10-23 NOTE — ED PROVIDER NOTE - PHYSICAL EXAMINATION
--EXAM--  VITAL SIGNS: I have reviewed vs documented at present.  CONSTITUTIONAL: labored breathing   SKIN: Warm and dry, no acute rash.   HEAD: Normocephalic; atraumatic.  EYES: PERRL, EOM intact; conjunctiva and sclera clear. No nystagmus.  ENT: No nasal discharge; airway clear.  NECK: Supple; non tender.  CARD: S1, S2, Regular rate and rhythm.   RESP: expiratory wheezing .  ABD: Normal bowel sounds; soft; non-distended; non-tender.

## 2024-10-23 NOTE — ED PROVIDER NOTE - CHILD ABUSE FACILITY
pt tolerated PO; no wob noted. MD at bedside to assess. Bulb suction education provided to family, no nasal secretions noted at this time. [facility]. [facility]

## 2024-10-23 NOTE — PATIENT PROFILE ADULT - FALL HARM RISK - HARM RISK INTERVENTIONS

## 2024-10-23 NOTE — ED PROVIDER NOTE - ATTENDING APP SHARED VISIT CONTRIBUTION OF CARE
74-year-old female past medical history noted including COPD, hypertension, dementia presents from home via EMS for evaluation of abdominal pain and vomiting today.,  States patient with increased weakness and she thinks she has a fever and possibly a UTI.  No known sick contacts, no diarrhea, on exam patient is in some distress with increased work of breathing, scattered wheezing, abdomen soft, nontender nondistended, no edema, no calf tenderness

## 2024-10-23 NOTE — H&P ADULT - HISTORY OF PRESENT ILLNESS
74-year-old female presented to the ED  by EMS.  Patient's daughter states that patient was weak and vomiting today.  Patient has a long history of COPD and is normally short of breath as per the daughter.  Patient's daughter is concerned that patient may have an infection causing this weakness and vomiting 74-year-old female w/PMHx of dementia, COPD presented to the ED  by EMS.  Patient's daughter Anya states that patient was weak and vomiting today.  Patient has a long history of COPD and is normally short of breath as per the daughter.  Patient's daughter is concerned that patient may have an infection causing this weakness and vomiting 74-year-old female w/PMHx of dementia, COPD on 3.5L O2 at home, questionable CVA many years ago, HTN, HLD, GERD presented to the ED  by EMS.  Patient's daughter Anya states that patient was weak and vomiting x3  this morning, fever of 102F, and foul smelling urine. Patient has a long history of COPD and is normally short of breath as per the daughter.  Patient's daughter is concerned that patient may have an infection causing this weakness and vomiting. No Hx obtainable from pt due to dementia.

## 2024-10-23 NOTE — H&P ADULT - IN ACCORDANCE WITH [place] STATE LAW, WE OFFER EVERY PATIENT A HEPATITIS C TEST. WOULD YOU LIKE TO BE TESTED TODAY?
Unable to answer due to medical condition/unresponsive/etc...
Statement Selected
Z Plasty Text: The lesion was extirpated to the level of the fat with a #15 scalpel blade.  Given the location of the defect, shape of the defect and the proximity to free margins a Z-plasty was deemed most appropriate for repair.  Using a sterile surgical marker, the appropriate transposition arms of the Z-plasty were drawn incorporating the defect and placing the expected incisions within the relaxed skin tension lines where possible.    The area thus outlined was incised deep to adipose tissue with a #15 scalpel blade.  The skin margins were undermined to an appropriate distance in all directions utilizing iris scissors.  The opposing transposition arms were then transposed into place in opposite direction and anchored with interrupted buried subcutaneous sutures.

## 2024-10-23 NOTE — H&P ADULT - ASSESSMENT
UTI    Shortness of breath - better on BIPAP        # Sepsis  # Acute UTI  - WBC 18.6, 102.6F, tachy, lactate 3.3  - c/w cefepime  - repeat lactate  - IVF  - f/u Ucx  - f/u Bcx  - tylenol for pyrexia  - ID consult  - monitor VS  - repeat am labs    # Shortness of breath  #  COPD  - breathing improved on BIPAP   - solumedrol 40 Q8H  - duonebs  - monitor SPO2 74-year-old female w/PMHx of dementia, COPD on 3.5L O2 at home, questionable CVA many years ago, HTN, HLD, GERD presented to the ED  by EMS for weakness, fever, SOB found to be septic 2/2 UTI    # Sepsis  # Acute UTI  - WBC 18.6, 102.6F, tachycardic, lactate 3.3  - c/w cefepime  - repeat lactate  - IVF  - f/u Ucx  - f/u Bcx  - tylenol for pyrexia  - ID consult  - zofran PRN  - monitor VS  - repeat am labs    # Shortness of breath  #  COPD  - breathing improved on BIPAP   - solumedrol 40 Q8H  - duonebs  - monitor SPO2  - pulmonary consult    # HTN  # HLD  - c/w lisinopril and atorvastatin   - DASH diet  - monitor BP    # GERD  - protonix for nexium     74-year-old female w/PMHx of dementia, COPD on 3.5L O2 at home, questionable CVA many years ago, HTN, HLD, GERD presented to the ED  by EMS for weakness, fever, SOB found to be septic 2/2 UTI    # Sepsis  # Acute UTI  - WBC 18.6, 102.6F, tachycardic, lactate 3.3  - c/w cefepime  - repeat lactate  - IVF  - f/u Ucx  - f/u Bcx  - tylenol for pyrexia  - ID consult  - zofran PRN  - monitor VS  - repeat am labs    # Shortness of breath  #  COPD  - breathing improved on BIPAP   - solumedrol 40 Q8H  - c/w albuterol MDI PRN  - monitor SPO2  - pulmonary consult    # HTN  # HLD  - c/w lisinopril and atorvastatin   - DASH diet  - monitor BP    # GERD  - protonix for nexium

## 2024-10-23 NOTE — ED PROVIDER NOTE - PROGRESS NOTE DETAILS
Spoke to family results discussed.  Patient needs to be admitted for UTI sepsis Patient was placed on BiPAP on arrival to the ED.  After being on BiPAP patient's breathing normal

## 2024-10-23 NOTE — H&P ADULT - NSHPPHYSICALEXAM_GEN_ALL_CORE
VITALS:   T(C): 38.3 (10-23-24 @ 19:54), Max: 39.2 (10-23-24 @ 18:26)  HR: 102 (10-23-24 @ 20:55) (100 - 147)  BP: 129/79 (10-23-24 @ 20:55) (112/82 - 136/88)  RR: 20 (10-23-24 @ 20:55) (18 - 20)  SpO2: 95% (10-23-24 @ 20:55) (92% - 96%)    GENERAL: NAD, lying in bed comfortably in good spirits  HEAD:  Atraumatic, Normocephalic  EYES: EOMI,  conjunctiva and sclera clear  ENT: Moist mucous membranes  NECK: Supple, No JVD  CHEST/LUNG: CTA b/l, Unlabored respirations  HEART: Regular rate and rhythm; No murmurs, rubs, or gallops  ABDOMEN: Bowel sounds present; Soft, Nontender, Nondistended.   EXTREMITIES: No clubbing, cyanosis, or edema  NERVOUS SYSTEM:  Alert & Oriented X0  MSK: FROM all 4 extremities, full and equal strength  SKIN: No rashes or lesions VITALS:   T(C): 38.3 (10-23-24 @ 19:54), Max: 39.2 (10-23-24 @ 18:26)  HR: 102 (10-23-24 @ 20:55) (100 - 147)  BP: 129/79 (10-23-24 @ 20:55) (112/82 - 136/88)  RR: 20 (10-23-24 @ 20:55) (18 - 20)  SpO2: 95% (10-23-24 @ 20:55) (92% - 96%)    GENERAL: NAD, lying in bed comfortably in good spirits  HEAD:  Atraumatic, Normocephalic  EYES: EOMI,  conjunctiva and sclera clear  ENT: Moist mucous membranes  NECK: Supple, No JVD  CHEST/LUNG: + wheezing, Unlabored respirations  HEART: Regular rate and rhythm; No murmurs, rubs, or gallops  ABDOMEN: Bowel sounds present; Soft, Nontender, Nondistended.   EXTREMITIES: No clubbing, cyanosis, or edema  NERVOUS SYSTEM:  Alert & Oriented X0  MSK: FROM all 4 extremities, full and equal strength  SKIN: No rashes or lesions

## 2024-10-23 NOTE — PATIENT PROFILE ADULT - FUNCTIONAL ASSESSMENT - BASIC MOBILITY 6.
2-calculated by average/Not able to assess (calculate score using [facility] averaging method)

## 2024-10-23 NOTE — PATIENT PROFILE ADULT - INTERNATIONAL TRAVEL
Post-Care Instructions: I reviewed with the patient in detail post-care instructions. Patient is to avoid sunlight for the next 2 days, and wear sun protection. Patients may expect sunburn like redness, discomfort and scabbing. No

## 2024-10-24 ENCOUNTER — TRANSCRIPTION ENCOUNTER (OUTPATIENT)
Age: 74
End: 2024-10-24

## 2024-10-24 VITALS
SYSTOLIC BLOOD PRESSURE: 121 MMHG | HEART RATE: 109 BPM | RESPIRATION RATE: 18 BRPM | OXYGEN SATURATION: 94 % | TEMPERATURE: 98 F | DIASTOLIC BLOOD PRESSURE: 76 MMHG

## 2024-10-24 LAB
ALBUMIN SERPL ELPH-MCNC: 3.4 G/DL — LOW (ref 3.5–5.2)
ALP SERPL-CCNC: 119 U/L — HIGH (ref 30–115)
ALT FLD-CCNC: 9 U/L — SIGNIFICANT CHANGE UP (ref 0–41)
ANION GAP SERPL CALC-SCNC: 11 MMOL/L — SIGNIFICANT CHANGE UP (ref 7–14)
AST SERPL-CCNC: 10 U/L — SIGNIFICANT CHANGE UP (ref 0–41)
BASOPHILS # BLD AUTO: 0.01 K/UL — SIGNIFICANT CHANGE UP (ref 0–0.2)
BASOPHILS NFR BLD AUTO: 0.1 % — SIGNIFICANT CHANGE UP (ref 0–1)
BILIRUB SERPL-MCNC: 0.3 MG/DL — SIGNIFICANT CHANGE UP (ref 0.2–1.2)
BUN SERPL-MCNC: 15 MG/DL — SIGNIFICANT CHANGE UP (ref 10–20)
CALCIUM SERPL-MCNC: 8.6 MG/DL — SIGNIFICANT CHANGE UP (ref 8.4–10.5)
CHLORIDE SERPL-SCNC: 101 MMOL/L — SIGNIFICANT CHANGE UP (ref 98–110)
CO2 SERPL-SCNC: 26 MMOL/L — SIGNIFICANT CHANGE UP (ref 17–32)
CREAT SERPL-MCNC: 0.6 MG/DL — LOW (ref 0.7–1.5)
EGFR: 93 ML/MIN/1.73M2 — SIGNIFICANT CHANGE UP
EOSINOPHIL # BLD AUTO: 0.28 K/UL — SIGNIFICANT CHANGE UP (ref 0–0.7)
EOSINOPHIL NFR BLD AUTO: 2.4 % — SIGNIFICANT CHANGE UP (ref 0–8)
GLUCOSE SERPL-MCNC: 111 MG/DL — HIGH (ref 70–99)
HCT VFR BLD CALC: 36.5 % — LOW (ref 37–47)
HGB BLD-MCNC: 11.7 G/DL — LOW (ref 12–16)
IMM GRANULOCYTES NFR BLD AUTO: 0.5 % — HIGH (ref 0.1–0.3)
LACTATE SERPL-SCNC: 1.7 MMOL/L — SIGNIFICANT CHANGE UP (ref 0.7–2)
LYMPHOCYTES # BLD AUTO: 0.63 K/UL — LOW (ref 1.2–3.4)
LYMPHOCYTES # BLD AUTO: 5.4 % — LOW (ref 20.5–51.1)
MCHC RBC-ENTMCNC: 27 PG — SIGNIFICANT CHANGE UP (ref 27–31)
MCHC RBC-ENTMCNC: 32.1 G/DL — SIGNIFICANT CHANGE UP (ref 32–37)
MCV RBC AUTO: 84.1 FL — SIGNIFICANT CHANGE UP (ref 81–99)
MONOCYTES # BLD AUTO: 0.2 K/UL — SIGNIFICANT CHANGE UP (ref 0.1–0.6)
MONOCYTES NFR BLD AUTO: 1.7 % — SIGNIFICANT CHANGE UP (ref 1.7–9.3)
NEUTROPHILS # BLD AUTO: 10.47 K/UL — HIGH (ref 1.4–6.5)
NEUTROPHILS NFR BLD AUTO: 89.9 % — HIGH (ref 42.2–75.2)
NRBC # BLD: 0 /100 WBCS — SIGNIFICANT CHANGE UP (ref 0–0)
PLATELET # BLD AUTO: 244 K/UL — SIGNIFICANT CHANGE UP (ref 130–400)
PMV BLD: 9.8 FL — SIGNIFICANT CHANGE UP (ref 7.4–10.4)
POTASSIUM SERPL-MCNC: 3.8 MMOL/L — SIGNIFICANT CHANGE UP (ref 3.5–5)
POTASSIUM SERPL-SCNC: 3.8 MMOL/L — SIGNIFICANT CHANGE UP (ref 3.5–5)
PROT SERPL-MCNC: 5.6 G/DL — LOW (ref 6–8)
RBC # BLD: 4.34 M/UL — SIGNIFICANT CHANGE UP (ref 4.2–5.4)
RBC # FLD: 14.1 % — SIGNIFICANT CHANGE UP (ref 11.5–14.5)
SODIUM SERPL-SCNC: 138 MMOL/L — SIGNIFICANT CHANGE UP (ref 135–146)
WBC # BLD: 11.65 K/UL — HIGH (ref 4.8–10.8)
WBC # FLD AUTO: 11.65 K/UL — HIGH (ref 4.8–10.8)

## 2024-10-24 PROCEDURE — 99222 1ST HOSP IP/OBS MODERATE 55: CPT

## 2024-10-24 PROCEDURE — 99233 SBSQ HOSP IP/OBS HIGH 50: CPT

## 2024-10-24 RX ORDER — CIPROFLOXACIN LACTATE 200MG/20ML
1 VIAL (ML) INTRAVENOUS
Qty: 14 | Refills: 0
Start: 2024-10-24 | End: 2024-10-30

## 2024-10-24 RX ADMIN — SODIUM CHLORIDE 75 MILLILITER(S): 9 INJECTION, SOLUTION INTRAMUSCULAR; INTRAVENOUS; SUBCUTANEOUS at 12:38

## 2024-10-24 RX ADMIN — METHYLPREDNISOLONE ACETATE 40 MILLIGRAM(S): 80 INJECTION, SUSPENSION INTRALESIONAL; INTRAMUSCULAR; INTRASYNOVIAL; SOFT TISSUE at 05:12

## 2024-10-24 RX ADMIN — CEFEPIME 100 MILLIGRAM(S): 2 INJECTION, POWDER, FOR SOLUTION INTRAVENOUS at 05:11

## 2024-10-24 RX ADMIN — Medication 81 MILLIGRAM(S): at 12:38

## 2024-10-24 RX ADMIN — PANTOPRAZOLE SODIUM 40 MILLIGRAM(S): 40 TABLET, DELAYED RELEASE ORAL at 05:12

## 2024-10-24 RX ADMIN — Medication 40 MILLIGRAM(S): at 05:12

## 2024-10-24 NOTE — DISCHARGE NOTE PROVIDER - HOSPITAL COURSE
74-year-old female w/PMHx of dementia, COPD on 3.5L O2 at home, questionable CVA many years ago, HTN, HLD, GERD presented to the ED  by EMS for weakness, fever, SOB found to be septic 2/2 UTI    # Sepsis  # Acute UTI/Pyelonephritis  - WBC 18.6, 102.6F, tachycardic, lactate 3.3  - cefepime:  transitioned to Cipro upon discharge  - IVF  - f/u Ucx  - f/u Bcx  - tylenol for pyrexia  - ID consulted  - zofran PRN  - monitored VS      # Shortness of breath  #  COPD  - breathing improved on BIPAP   - c/w albuterol MDI PRN  - monitor SPO2  - pulmonary consulted    # HTN  # HLD  - c/w lisinopril and atorvastatin   - DASH diet    Patient decide to leave AMA on 10/24/2024    This discharge discussed with Dr Cagle   74-year-old female w/PMHx of dementia, COPD on 3.5L O2 at home, questionable CVA many years ago, HTN, HLD, GERD presented to the ED  by EMS for weakness, fever, SOB found to be septic 2/2 UTI    # Sepsis  # Acute UTI/Pyelonephritis  - WBC 18.6, 102.6F, tachycardic, lactate 3.3  - cefepime:  transitioned to Cipro upon discharge  - IVF  - f/u Ucx  - f/u Bcx  - tylenol for pyrexia  - ID consulted  - zofran PRN  - monitored VS      # Shortness of breath  #  COPD  - breathing improved on BIPAP   - c/w albuterol MDI PRN  - monitor SPO2  - pulmonary consulted    # HTN  # HLD  - c/w lisinopril and atorvastatin   - DASH diet    Patient decide to leave AMA on 10/24/2024    This discharge discussed with Dr Cagle    ** Dr. Cagle spoke at length w/ hcp daughter at bedside:  expressed understanding family concern over delirium on dementia however was very clear that pt was sick, had a severe infection, and the risk of further decompensating was much higher than the possible benefit of avoiding a period of delirium.  Family was adamant about leaving AMA; sent PO ABx to pharmacy after speaking with ID team

## 2024-10-24 NOTE — DISCHARGE NOTE NURSING/CASE MANAGEMENT/SOCIAL WORK - NSDCPEFALRISK_GEN_ALL_CORE
For information on Fall & Injury Prevention, visit: [email or web address] OR  [email or web address] OR  https://www.cdc.gov/steadi/patient.html

## 2024-10-24 NOTE — DISCHARGE NOTE PROVIDER - PROVIDER TOKENS
PROVIDER:[TOKEN:[[id number]],FOLLOWUP:[2 weeks]],FREE:[LAST:[Private MD],PHONE:[(   )    -],FAX:[(   )    -],FOLLOWUP:[Routine]]

## 2024-10-24 NOTE — DISCHARGE NOTE PROVIDER - NSDCMRMEDTOKEN_GEN_ALL_CORE_FT
Albuterol (Eqv-Proventil HFA) 90 mcg/inh inhalation aerosol: 2 puff(s) inhaled every 6 hours as needed for  shortness of breath and/or wheezing  Aspir 81 oral delayed release tablet: 1 tab(s) orally once a day  atorvastatin 40 mg oral tablet: 1 tab(s) orally once a day (at bedtime)  D3 125 mcg/0.5 mL (5000 intl units/0.5 mL) oral liquid: 0.5 milliliter(s) orally  lisinopril 40 mg oral tablet: 1 tab(s) orally once a day  NexIUM 40 mg oral delayed release capsule: 1 cap(s) orally once a day   Albuterol (Eqv-Proventil HFA) 90 mcg/inh inhalation aerosol: 2 puff(s) inhaled every 6 hours as needed for  shortness of breath and/or wheezing  Aspir 81 oral delayed release tablet: 1 tab(s) orally once a day  atorvastatin 40 mg oral tablet: 1 tab(s) orally once a day (at bedtime)  Cipro 500 mg oral tablet: 1 tab(s) orally 2 times a day  D3 125 mcg/0.5 mL (5000 intl units/0.5 mL) oral liquid: 0.5 milliliter(s) orally  lisinopril 40 mg oral tablet: 1 tab(s) orally once a day  NexIUM 40 mg oral delayed release capsule: 1 cap(s) orally once a day

## 2024-10-24 NOTE — CONSULT NOTE ADULT - SUBJECTIVE AND OBJECTIVE BOX
INFECTIOUS DISEASE CONSULT NOTE    Patient is a 74y old  Female who presents with a chief complaint of weak, vomiting.    HPI:  74-year-old female w/PMHx of dementia, COPD on 3.5L O2 at home, questionable CVA many years ago, HTN, HLD, GERD presented to the ED  by EMS.  Patient's daughter Anya states that patient was weak and vomiting x3  this morning, fever of 102F, and foul smelling urine. Patient has a long history of COPD and is normally short of breath as per the daughter.  Patient's daughter is concerned that patient may have an infection causing this weakness and vomiting. No Hx obtainable from pt due to dementia.  (23 Oct 2024 21:24)         Prior hospital charts reviewed [Yes]  Primary team notes reviewed [Yes]  Other consultant notes reviewed [Yes]    REVIEW OF SYSTEMS:      PAST MEDICAL & SURGICAL HISTORY:  COPD exacerbation      COVID-19      Tobacco user      HTN (hypertension)      CVA (cerebral vascular accident)      No significant past surgical history          SOCIAL HISTORY:  - Born in [place], migrated [place] in 19XX  - Currently working as / Retired  - Lives with _____; no pets  - No recent travel  - Denies tobacco use  - Denies alcohol use  - Denies illicit drug use  - Currently sexually active, has one male/female sexual partner    FAMILY HISTORY:      Allergies:  penicillin (Unknown)  beta blockers (Rash; Flushing; Short breath)      ANTIMICROBIALS:  cefepime   IVPB 1000 every 12 hours      ANTIMICROBIALS (past 90 days):  MEDICATIONS  (STANDING):  cefepime   IVPB   100 mL/Hr IV Intermittent (10-24-24 @ 05:11)    cefepime   IVPB   100 mL/Hr IV Intermittent (10-23-24 @ 18:23)        OTHER MEDS:   MEDICATIONS  (STANDING):  acetaminophen     Tablet .. 650 every 6 hours PRN  albuterol    90 MICROgram(s) HFA Inhaler 2 every 6 hours PRN  aluminum hydroxide/magnesium hydroxide/simethicone Suspension 30 every 4 hours PRN  aspirin enteric coated 81 daily  atorvastatin 40 at bedtime  lisinopril 40 daily  ondansetron Injectable 4 every 8 hours PRN  pantoprazole    Tablet 40 before breakfast      VITALS:  Vital Signs Last 24 Hrs  T(F): 98.4 (10-24-24 @ 04:26), Max: 102.6 (10-23-24 @ 18:26)    Vital Signs Last 24 Hrs  HR: 99 (10-24-24 @ 04:26) (96 - 147)  BP: 122/76 (10-24-24 @ 04:26) (99/64 - 136/88)  RR: 25 (10-23-24 @ 22:34)  SpO2: 94% (10-24-24 @ 04:26) (92% - 99%)  Wt(kg): --    EXAM:    Labs:                        11.7   11.65 )-----------( 244      ( 24 Oct 2024 06:08 )             36.5     10-24    138  |  101  |  15  ----------------------------<  111[H]  3.8   |  26  |  0.6[L]    Ca    8.6      24 Oct 2024 06:08    TPro  5.6[L]  /  Alb  3.4[L]  /  TBili  0.3  /  DBili  x   /  AST  10  /  ALT  9   /  AlkPhos  119[H]  10-24      WBC Trend:  WBC Count: 11.65 (10-24-24 @ 06:08)  WBC Count: 18.59 (10-23-24 @ 17:50)      Auto Neutrophil #: 10.47 K/uL (10-24-24 @ 06:08)  Auto Neutrophil #: 16.65 K/uL (10-23-24 @ 17:50)  Auto Neutrophil #: 20.72 K/uL (05-02-24 @ 02:35)  Auto Neutrophil #: 15.89 K/uL (05-01-24 @ 05:48)  Auto Neutrophil #: 9.22 K/uL (04-30-24 @ 05:58)      Creatine Trend:  Creatinine: 0.6 (10-24)  Creatinine: 0.7 (10-23)      Liver Biochemical Testing Trend:  Alanine Aminotransferase (ALT/SGPT): 9 (10-24)  Alanine Aminotransferase (ALT/SGPT): 13 (10-23)  Alanine Aminotransferase (ALT/SGPT): 9 (05-02)  Alanine Aminotransferase (ALT/SGPT): <5 (05-01)  Alanine Aminotransferase (ALT/SGPT): 6 (04-30)  Aspartate Aminotransferase (AST/SGOT): 10 (10-24-24 @ 06:08)  Aspartate Aminotransferase (AST/SGOT): 14 (10-23-24 @ 17:50)  Aspartate Aminotransferase (AST/SGOT): 22 (05-02-24 @ 02:35)  Aspartate Aminotransferase (AST/SGOT): 10 (05-01-24 @ 05:48)  Aspartate Aminotransferase (AST/SGOT): 10 (04-30-24 @ 05:58)  Bilirubin Total: 0.3 (10-24)  Bilirubin Total: 0.4 (10-23)  Bilirubin Total: 0.2 (05-02)  Bilirubin Total: 0.2 (05-01)  Bilirubin Total: 0.2 (04-30)      Trend LDH      Auto Eosinophil %: 2.4 % (10-24-24 @ 06:08)  Auto Eosinophil %: 0.2 % (10-23-24 @ 17:50)      Urinalysis Basic - ( 24 Oct 2024 06:08 )    Color: x / Appearance: x / SG: x / pH: x  Gluc: 111 mg/dL / Ketone: x  / Bili: x / Urobili: x   Blood: x / Protein: x / Nitrite: x   Leuk Esterase: x / RBC: x / WBC x   Sq Epi: x / Non Sq Epi: x / Bacteria: x          MICROBIOLOGY:    Troponin T, High Sensitivity Result: <6 (10-23)  Troponin T, High Sensitivity Result: <6 (10-23)    Lactate, Blood: 1.7 (10-24 @ 06:08)  Lactate, Blood: 3.3 (10-23 @ 20:25)  Blood Gas Venous - Lactate: 3.2 (10-23 @ 18:06)    RADIOLOGY:  imaging below personally reviewed    < from: CT Abdomen and Pelvis w/ IV Cont (10.23.24 @ 19:46) >  IMPRESSION:    No evidence of acute abdominal or pelvic pathology    Multiple severe age-indeterminate compression fractures with   vertebroplasty changes of the L4 vertebra. Osteopenia      < end of copied text >    < from: Xray Chest 1 View- PORTABLE-Urgent (10.23.24 @ 19:29) >    IMPRESSION:    No radiographic evidence of acute cardiopulmonary disease.    < end of copied text >   INFECTIOUS DISEASE CONSULT NOTE    Patient is a 74y old  Female who presents with a chief complaint of weak, vomiting.    HPI:  74-year-old female w/PMHx of dementia, COPD on 3.5L O2 at home, questionable CVA many years ago, HTN, HLD, GERD presented to the ED  by EMS.  Patient's daughter Anya states that patient was weak and vomiting x3  this morning, fever of 102F, and foul smelling urine. Patient has a long history of COPD and is normally short of breath as per the daughter.  Patient's daughter is concerned that patient may have an infection causing this weakness and vomiting. No Hx obtainable from pt due to dementia.  (23 Oct 2024 21:24)         Prior hospital charts reviewed [Yes]  Primary team notes reviewed [Yes]  Other consultant notes reviewed [Yes]    REVIEW OF SYSTEMS:  Unable to provide due to cognitive impairment      PAST MEDICAL & SURGICAL HISTORY:  COPD exacerbation      COVID-19      Tobacco user      HTN (hypertension)      CVA (cerebral vascular accident)      No significant past surgical history          SOCIAL HISTORY:  - No recent travel  - Denies tobacco use  - Denies alcohol use  - Denies illicit drug use    FAMILY HISTORY:  Family history of dementia, CAD, DM    Allergies:  penicillin (Unknown)  beta blockers (Rash; Flushing; Short breath)      ANTIMICROBIALS:  cefepime   IVPB 1000 every 12 hours      ANTIMICROBIALS (past 90 days):  MEDICATIONS  (STANDING):  cefepime   IVPB   100 mL/Hr IV Intermittent (10-24-24 @ 05:11)    cefepime   IVPB   100 mL/Hr IV Intermittent (10-23-24 @ 18:23)        OTHER MEDS:   MEDICATIONS  (STANDING):  acetaminophen     Tablet .. 650 every 6 hours PRN  albuterol    90 MICROgram(s) HFA Inhaler 2 every 6 hours PRN  aluminum hydroxide/magnesium hydroxide/simethicone Suspension 30 every 4 hours PRN  aspirin enteric coated 81 daily  atorvastatin 40 at bedtime  lisinopril 40 daily  ondansetron Injectable 4 every 8 hours PRN  pantoprazole    Tablet 40 before breakfast      VITALS:  Vital Signs Last 24 Hrs  T(F): 98.4 (10-24-24 @ 04:26), Max: 102.6 (10-23-24 @ 18:26)    Vital Signs Last 24 Hrs  HR: 99 (10-24-24 @ 04:26) (96 - 147)  BP: 122/76 (10-24-24 @ 04:26) (99/64 - 136/88)  RR: 25 (10-23-24 @ 22:34)  SpO2: 94% (10-24-24 @ 04:26) (92% - 99%)  Wt(kg): --    EXAM:  GENERAL: NAD, lying in bed  HEAD: No head lesions  EYES: Conjunctiva pink and cornea white  EAR, NOSE, MOUTH, THROAT: Normal external ears and nose, no discharges; moist mucous membranes  NECK: Supple, nontender to palpation; no JVD  RESPIRATORY: Clear to auscultation bilaterally  CARDIOVASCULAR: S1 S2  GASTROINTESTINAL: Soft, nontender, nondistended; normoactive bowel sounds  GENITOURINARY: No schmidt catheter, no CVA tenderness  EXTREMITIES: No clubbing, cyanosis, or petal edema  NERVOUS SYSTEM: Awake and alert. able to answer simple questions  MUSCULOSKELETAL: No joint erythema, swelling or pain  SKIN: No rashes or lesions, no superficial thrombophlebitis  PSYCH: Normal affect, calm      Labs:                        11.7   11.65 )-----------( 244      ( 24 Oct 2024 06:08 )             36.5     10-24    138  |  101  |  15  ----------------------------<  111[H]  3.8   |  26  |  0.6[L]    Ca    8.6      24 Oct 2024 06:08    TPro  5.6[L]  /  Alb  3.4[L]  /  TBili  0.3  /  DBili  x   /  AST  10  /  ALT  9   /  AlkPhos  119[H]  10-24      WBC Trend:  WBC Count: 11.65 (10-24-24 @ 06:08)  WBC Count: 18.59 (10-23-24 @ 17:50)      Auto Neutrophil #: 10.47 K/uL (10-24-24 @ 06:08)  Auto Neutrophil #: 16.65 K/uL (10-23-24 @ 17:50)  Auto Neutrophil #: 20.72 K/uL (05-02-24 @ 02:35)  Auto Neutrophil #: 15.89 K/uL (05-01-24 @ 05:48)  Auto Neutrophil #: 9.22 K/uL (04-30-24 @ 05:58)      Creatine Trend:  Creatinine: 0.6 (10-24)  Creatinine: 0.7 (10-23)      Liver Biochemical Testing Trend:  Alanine Aminotransferase (ALT/SGPT): 9 (10-24)  Alanine Aminotransferase (ALT/SGPT): 13 (10-23)  Alanine Aminotransferase (ALT/SGPT): 9 (05-02)  Alanine Aminotransferase (ALT/SGPT): <5 (05-01)  Alanine Aminotransferase (ALT/SGPT): 6 (04-30)  Aspartate Aminotransferase (AST/SGOT): 10 (10-24-24 @ 06:08)  Aspartate Aminotransferase (AST/SGOT): 14 (10-23-24 @ 17:50)  Aspartate Aminotransferase (AST/SGOT): 22 (05-02-24 @ 02:35)  Aspartate Aminotransferase (AST/SGOT): 10 (05-01-24 @ 05:48)  Aspartate Aminotransferase (AST/SGOT): 10 (04-30-24 @ 05:58)  Bilirubin Total: 0.3 (10-24)  Bilirubin Total: 0.4 (10-23)  Bilirubin Total: 0.2 (05-02)  Bilirubin Total: 0.2 (05-01)  Bilirubin Total: 0.2 (04-30)      Trend LDH      Auto Eosinophil %: 2.4 % (10-24-24 @ 06:08)  Auto Eosinophil %: 0.2 % (10-23-24 @ 17:50)      Urinalysis Basic - ( 24 Oct 2024 06:08 )    Color: x / Appearance: x / SG: x / pH: x  Gluc: 111 mg/dL / Ketone: x  / Bili: x / Urobili: x   Blood: x / Protein: x / Nitrite: x   Leuk Esterase: x / RBC: x / WBC x   Sq Epi: x / Non Sq Epi: x / Bacteria: x          MICROBIOLOGY:    Troponin T, High Sensitivity Result: <6 (10-23)  Troponin T, High Sensitivity Result: <6 (10-23)    Lactate, Blood: 1.7 (10-24 @ 06:08)  Lactate, Blood: 3.3 (10-23 @ 20:25)  Blood Gas Venous - Lactate: 3.2 (10-23 @ 18:06)    RADIOLOGY:  imaging below personally reviewed    < from: CT Abdomen and Pelvis w/ IV Cont (10.23.24 @ 19:46) >  IMPRESSION:    No evidence of acute abdominal or pelvic pathology    Multiple severe age-indeterminate compression fractures with   vertebroplasty changes of the L4 vertebra. Osteopenia      < end of copied text >    < from: Xray Chest 1 View- PORTABLE-Urgent (10.23.24 @ 19:29) >    IMPRESSION:    No radiographic evidence of acute cardiopulmonary disease.    < end of copied text >

## 2024-10-24 NOTE — CONSULT NOTE ADULT - ASSESSMENT
74-year-old female w/PMHx of dementia, COPD on 3.5L O2 at home, questionable CVA many years ago, HTN, HLD, GERD presented to the ED by EMS.     ID is consulted for UTI  Febrile to 102.6, WBC 18.59 > 11.65  Was on BiPAP, now down to NC  UA with pyuria, UCx pending  BCx pending    CXR no PNA    CT A/P w contrast 10/23  No evidence of acute abdominal or pelvic pathology  Multiple severe age-indeterminate compression fractures with vertebroplasty changes of the L4 vertebra. Osteopenia.    Antibiotics:  Cefepime 10/23 ->      IMPRESSION:      RECOMMENDATIONS:      * THIS IS AN INCOMPLETE NOTE. FINAL RECOMMENDATION IS PENDING *   74-year-old female w/PMHx of dementia, COPD on 3.5L O2 at home, questionable CVA many years ago, HTN, HLD, GERD presented to the ED by EMS.     ID is consulted for UTI  Febrile to 102.6, WBC 18.59 > 11.65  Was on BiPAP, now down to NC  UA with pyuria, UCx pending  BCx pending    CXR no PNA    CT A/P w contrast 10/23  No evidence of acute abdominal or pelvic pathology  Multiple severe age-indeterminate compression fractures with vertebroplasty changes of the L4 vertebra. Osteopenia.    Antibiotics:  Cefepime 10/23 ->      IMPRESSION:  Acute UTI  Sepsis  COPD  Dementia  Immunosuppression / Immunosenescence which could result in poor clinical outcome    RECOMMENDATIONS:  - D/C cefepime, start IV ceftriaxone 1g q24hrs  - Follow up BCx and UCx  - Discussed with patient's daughter, she believes that patient looks better and would like to bring her home as soon as possible, so patient is in her familiar environment  - If daughter decides to sign out AMA, can switch to PO Cipro 500mg q12hrs to complete 7-day treatment  - Plan discussed with hospitalist      Maria D Brown D.O.  Attending Physician  Division of Infectious Diseases  [facility]  Please contact me via [employer] Teams

## 2024-10-24 NOTE — DISCHARGE NOTE PROVIDER - CARE PROVIDER_API CALL
Maria D Brown  Infectious Disease  [address] [place] [address]  Phone: [phone]  Fax: [phone]  Follow Up Time: 2 weeks    Private MD,   Phone: (   )    -  Fax: (   )    -  Follow Up Time: Routine

## 2024-10-24 NOTE — DISCHARGE NOTE NURSING/CASE MANAGEMENT/SOCIAL WORK - NSDCPEWEB_GEN_ALL_CORE
[facility] for Tobacco Control website --- [email or web address] website --- [email or web address]

## 2024-10-24 NOTE — DISCHARGE NOTE NURSING/CASE MANAGEMENT/SOCIAL WORK - NSDCPEEMAIL_GEN_ALL_CORE
[facility] for Tobacco Control email [email or web address]

## 2024-10-24 NOTE — DISCHARGE NOTE NURSING/CASE MANAGEMENT/SOCIAL WORK - FINANCIAL ASSISTANCE
[facility] provides services at a reduced cost to those who are determined to be eligible through [facility]’s financial assistance program. Information regarding [facility]’s financial assistance program can be found by going to [email or web address] or by calling [phone].

## 2024-10-24 NOTE — CONSULT NOTE ADULT - ASSESSMENT
IMPRESSION:    COPD, not in active exacerbation  Former Smoker, quit in May 2024, >40 pack year   UTI    PLAN:    CXR reviewed   As per daughterAnya-present by bedside, patient has home visiting Pulmonologist, was seen 2 week prior, currently on Symbicort and Albuterol as needed  Continue home inhalers  Albuterol Nebs PRN  DC Solumedrol   Target SaO2 8-92%   Remains on home 3.5L [place] oxygen  Pulmonary toilet  Encourage incentive spirometry while inpatient  PT/OT  Treat UTI and management per primary  Patient to follow up with private Pulmonologist

## 2024-10-24 NOTE — DISCHARGE NOTE PROVIDER - NSDCCPCAREPLAN_GEN_ALL_CORE_FT
PRINCIPAL DISCHARGE DIAGNOSIS  Diagnosis: Pyelonephritis  Assessment and Plan of Treatment: Treated with IV antibiotic then transitioned to oral Ciprofloxacin 500mg Q12H x 7 days, to follow up with Infectios disease Specialist Dr alma delia Brown in 2 weeks: please call for appointment after discharge      SECONDARY DISCHARGE DIAGNOSES  Diagnosis: COPD without exacerbation  Assessment and Plan of Treatment: continue home respiratory medications

## 2024-10-24 NOTE — DISCHARGE NOTE NURSING/CASE MANAGEMENT/SOCIAL WORK - PATIENT PORTAL LINK FT
You can access the FollowMyHealth Patient Portal offered by [facility] by registering at the following website: [email or web address]. By joining [employer]’s FollowMyHealth portal, you will also be able to view your health information using other applications (apps) compatible with our system.

## 2024-10-24 NOTE — CONSULT NOTE ADULT - SUBJECTIVE AND OBJECTIVE BOX
Patient is a 74y old  Female who presents with a chief complaint of Weak and vomiting (24 Oct 2024 09:15)      HPI: 74-year-old female w/PMHx of dementia, COPD on 3.5L O2 at home, questionable CVA many years ago, HTN, HLD, GERD presented to the ED  by EMS.  Patient's daughter Anya states that patient was weak and vomiting x3  this morning, fever of 102F, and foul smelling urine. Patient has a long history of COPD and is normally short of breath as per the daughter.  Patient's daughter is concerned that patient may have an infection causing this weakness and vomiting. No Hx obtainable from pt due to dementia.  (23 Oct 2024 21:24)      PAST MEDICAL & SURGICAL HISTORY:  COPD exacerbation      COVID-19      Tobacco user      HTN (hypertension)      CVA (cerebral vascular accident)      No significant past surgical history            FAMILY HISTORY:      REVIEW OF SYSTEMS:    All ROS are negative exept per HPI       Allergies    penicillin (Unknown)  beta blockers (Rash; Flushing; Short breath)    Intolerances          T(C): 36.9 (24 Oct 2024 04:26), Max: 39.2 (23 Oct 2024 18:26)  T(F): 98.4 (24 Oct 2024 04:26), Max: 102.6 (23 Oct 2024 18:26)  HR: 99 (24 Oct 2024 04:26) (96 - 147)  BP: 122/76 (24 Oct 2024 04:26) (99/64 - 136/88)  RR: 25 (23 Oct 2024 22:34) (18 - 25)  SpO2: 94% (24 Oct 2024 04:26) (92% - 99%)    Parameters below as of 24 Oct 2024 04:26  Patient On (Oxygen Delivery Method): nasal cannula        CONSTITUTIONAL:   NAD    ENT:   Airway patent,   No thrush    EYES:   Clear bilaterally,   pupils equal,   round and reactive to light.    CARDIAC:   Normal rate,   regular rhythm.          RESPIRATORY:   No wheezing   Normal chest expansion  Not tachypneic,  No use of accessory muscles    GASTROINTESTINAL:  Abdomen soft, non-tender,   No guarding,   Positive BS    MUSCULOSKELETAL:   No clubbing, cyanosis    NEUROLOGICAL:   Alert and oriented       SKIN:   Skin normal color for race,         HEME LYMPH:   No cervical  lymphadenopathy.  no inguinal lymphadenopathy          LABS:                          11.7   11.65 )-----------( 244      ( 24 Oct 2024 06:08 )             36.5                                               10-24    138  |  101  |  15  ----------------------------<  111[H]  3.8   |  26  |  0.6[L]    Ca    8.6      24 Oct 2024 06:08    TPro  5.6[L]  /  Alb  3.4[L]  /  TBili  0.3  /  DBili  x   /  AST  10  /  ALT  9   /  AlkPhos  119[H]  10-24                                             Urinalysis Basic - ( 24 Oct 2024 06:08 )    Color: x / Appearance: x / SG: x / pH: x  Gluc: 111 mg/dL / Ketone: x  / Bili: x / Urobili: x   Blood: x / Protein: x / Nitrite: x   Leuk Esterase: x / RBC: x / WBC x   Sq Epi: x / Non Sq Epi: x / Bacteria: x                                                  LIVER FUNCTIONS - ( 24 Oct 2024 06:08 )  Alb: 3.4 g/dL / Pro: 5.6 g/dL / ALK PHOS: 119 U/L / ALT: 9 U/L / AST: 10 U/L / GGT: x                                                                                                MEDICATIONS  (STANDING):  aspirin enteric coated 81 milliGRAM(s) Oral daily  atorvastatin 40 milliGRAM(s) Oral at bedtime  cefepime   IVPB 1000 milliGRAM(s) IV Intermittent every 12 hours  lisinopril 40 milliGRAM(s) Oral daily  pantoprazole    Tablet 40 milliGRAM(s) Oral before breakfast  sodium chloride 0.9%. 1000 milliLiter(s) (75 mL/Hr) IV Continuous <Continuous>    MEDICATIONS  (PRN):  acetaminophen     Tablet .. 650 milliGRAM(s) Oral every 6 hours PRN Temp greater or equal to 38C (100.4F), Mild Pain (1 - 3)  albuterol    90 MICROgram(s) HFA Inhaler 2 Puff(s) Inhalation every 6 hours PRN for shortness of breath and/or wheezing  aluminum hydroxide/magnesium hydroxide/simethicone Suspension 30 milliLiter(s) Oral every 4 hours PRN Dyspepsia  ondansetron Injectable 4 milliGRAM(s) IV Push every 8 hours PRN Nausea and/or Vomiting       Patient is a 74y old  Female who presents with a chief complaint of Weak and vomiting (24 Oct 2024 09:15)      HPI: 74-year-old female w/PMHx of dementia, COPD on 3.5L O2 at home, questionable CVA many years ago, HTN, HLD, GERD presented to the ED  by EMS.  Patient's daughter Anya states that patient was weak and vomiting x3  this morning, fever of 102F, and foul smelling urine. Patient has a long history of COPD and is normally short of breath as per the daughter.  Patient's daughter is concerned that patient may have an infection causing this weakness and vomiting. No Hx obtainable from pt due to dementia.  (23 Oct 2024 21:24)    Patient's daughter Anya states patient remains on home o2 at 3.5L NC, has not had worsening shortness of breath or productive cough.      PAST MEDICAL & SURGICAL HISTORY:  COPD exacerbation      COVID-19      Tobacco user      HTN (hypertension)      CVA (cerebral vascular accident)      No significant past surgical history            FAMILY HISTORY:      REVIEW OF SYSTEMS:    All ROS are negative exept per HPI       Allergies    penicillin (Unknown)  beta blockers (Rash; Flushing; Short breath)    Intolerances          T(C): 36.9 (24 Oct 2024 04:26), Max: 39.2 (23 Oct 2024 18:26)  T(F): 98.4 (24 Oct 2024 04:26), Max: 102.6 (23 Oct 2024 18:26)  HR: 99 (24 Oct 2024 04:26) (96 - 147)  BP: 122/76 (24 Oct 2024 04:26) (99/64 - 136/88)  RR: 25 (23 Oct 2024 22:34) (18 - 25)  SpO2: 94% (24 Oct 2024 04:26) (92% - 99%)    Parameters below as of 24 Oct 2024 04:26  Patient On (Oxygen Delivery Method): nasal cannula        CONSTITUTIONAL:   NAD    ENT:   Airway patent,   No thrush    EYES:   Clear bilaterally,   pupils equal,   round and reactive to light.    CARDIAC:   Normal rate,   regular rhythm.          RESPIRATORY:   Mild bilateral rhonchi   No wheezing   Normal chest expansion  Not tachypneic,  No use of accessory muscles    GASTROINTESTINAL:  Abdomen soft, non-tender,   No guarding,   Positive BS    MUSCULOSKELETAL:   No clubbing, cyanosis    NEUROLOGICAL:   Alert and oriented       SKIN:   Skin normal color for race,         HEME LYMPH:   No cervical  lymphadenopathy.  no inguinal lymphadenopathy          LABS:                          11.7   11.65 )-----------( 244      ( 24 Oct 2024 06:08 )             36.5                                               10-24    138  |  101  |  15  ----------------------------<  111[H]  3.8   |  26  |  0.6[L]    Ca    8.6      24 Oct 2024 06:08    TPro  5.6[L]  /  Alb  3.4[L]  /  TBili  0.3  /  DBili  x   /  AST  10  /  ALT  9   /  AlkPhos  119[H]  10-24                                             Urinalysis Basic - ( 24 Oct 2024 06:08 )    Color: x / Appearance: x / SG: x / pH: x  Gluc: 111 mg/dL / Ketone: x  / Bili: x / Urobili: x   Blood: x / Protein: x / Nitrite: x   Leuk Esterase: x / RBC: x / WBC x   Sq Epi: x / Non Sq Epi: x / Bacteria: x                                                  LIVER FUNCTIONS - ( 24 Oct 2024 06:08 )  Alb: 3.4 g/dL / Pro: 5.6 g/dL / ALK PHOS: 119 U/L / ALT: 9 U/L / AST: 10 U/L / GGT: x                                                                                                MEDICATIONS  (STANDING):  aspirin enteric coated 81 milliGRAM(s) Oral daily  atorvastatin 40 milliGRAM(s) Oral at bedtime  cefepime   IVPB 1000 milliGRAM(s) IV Intermittent every 12 hours  lisinopril 40 milliGRAM(s) Oral daily  pantoprazole    Tablet 40 milliGRAM(s) Oral before breakfast  sodium chloride 0.9%. 1000 milliLiter(s) (75 mL/Hr) IV Continuous <Continuous>    MEDICATIONS  (PRN):  acetaminophen     Tablet .. 650 milliGRAM(s) Oral every 6 hours PRN Temp greater or equal to 38C (100.4F), Mild Pain (1 - 3)  albuterol    90 MICROgram(s) HFA Inhaler 2 Puff(s) Inhalation every 6 hours PRN for shortness of breath and/or wheezing  aluminum hydroxide/magnesium hydroxide/simethicone Suspension 30 milliLiter(s) Oral every 4 hours PRN Dyspepsia  ondansetron Injectable 4 milliGRAM(s) IV Push every 8 hours PRN Nausea and/or Vomiting

## 2024-10-26 LAB
CULTURE RESULTS: ABNORMAL
SPECIMEN SOURCE: SIGNIFICANT CHANGE UP

## 2024-10-28 LAB
CULTURE RESULTS: SIGNIFICANT CHANGE UP
CULTURE RESULTS: SIGNIFICANT CHANGE UP
SPECIMEN SOURCE: SIGNIFICANT CHANGE UP
SPECIMEN SOURCE: SIGNIFICANT CHANGE UP

## 2024-10-31 DIAGNOSIS — I10 ESSENTIAL (PRIMARY) HYPERTENSION: ICD-10-CM

## 2024-10-31 DIAGNOSIS — Z86.73 PERSONAL HISTORY OF TRANSIENT ISCHEMIC ATTACK (TIA), AND CEREBRAL INFARCTION WITHOUT RESIDUAL DEFICITS: ICD-10-CM

## 2024-10-31 DIAGNOSIS — Z79.52 LONG TERM (CURRENT) USE OF SYSTEMIC STEROIDS: ICD-10-CM

## 2024-10-31 DIAGNOSIS — E78.5 HYPERLIPIDEMIA, UNSPECIFIED: ICD-10-CM

## 2024-10-31 DIAGNOSIS — N39.0 URINARY TRACT INFECTION, SITE NOT SPECIFIED: ICD-10-CM

## 2024-10-31 DIAGNOSIS — Z88.0 ALLERGY STATUS TO PENICILLIN: ICD-10-CM

## 2024-10-31 DIAGNOSIS — Z79.82 LONG TERM (CURRENT) USE OF ASPIRIN: ICD-10-CM

## 2024-10-31 DIAGNOSIS — R11.10 VOMITING, UNSPECIFIED: ICD-10-CM

## 2024-10-31 DIAGNOSIS — Z86.16 PERSONAL HISTORY OF COVID-19: ICD-10-CM

## 2024-10-31 DIAGNOSIS — A41.9 SEPSIS, UNSPECIFIED ORGANISM: ICD-10-CM

## 2024-10-31 DIAGNOSIS — Z88.9 ALLERGY STATUS TO UNSPECIFIED DRUGS, MEDICAMENTS AND BIOLOGICAL SUBSTANCES: ICD-10-CM

## 2024-10-31 DIAGNOSIS — K21.9 GASTRO-ESOPHAGEAL REFLUX DISEASE WITHOUT ESOPHAGITIS: ICD-10-CM

## 2024-10-31 DIAGNOSIS — J44.9 CHRONIC OBSTRUCTIVE PULMONARY DISEASE, UNSPECIFIED: ICD-10-CM

## 2024-10-31 DIAGNOSIS — Z79.51 LONG TERM (CURRENT) USE OF INHALED STEROIDS: ICD-10-CM

## 2024-10-31 DIAGNOSIS — Z87.891 PERSONAL HISTORY OF NICOTINE DEPENDENCE: ICD-10-CM

## 2024-10-31 DIAGNOSIS — Z99.81 DEPENDENCE ON SUPPLEMENTAL OXYGEN: ICD-10-CM

## 2024-11-29 ENCOUNTER — INPATIENT (INPATIENT)
Facility: HOSPITAL | Age: 74
LOS: 0 days | Discharge: LEFT BEFORE DISCHARGE | DRG: 189 | End: 2024-11-30
Attending: INTERNAL MEDICINE | Admitting: INTERNAL MEDICINE
Payer: MEDICARE

## 2024-11-29 VITALS
WEIGHT: 125 LBS | RESPIRATION RATE: 28 BRPM | HEART RATE: 130 BPM | OXYGEN SATURATION: 98 % | DIASTOLIC BLOOD PRESSURE: 66 MMHG | SYSTOLIC BLOOD PRESSURE: 108 MMHG | HEIGHT: 62 IN

## 2024-11-29 DIAGNOSIS — J96.02 ACUTE RESPIRATORY FAILURE WITH HYPERCAPNIA: ICD-10-CM

## 2024-11-29 LAB
ALBUMIN SERPL ELPH-MCNC: 4.1 G/DL — SIGNIFICANT CHANGE UP (ref 3.5–5.2)
ALP SERPL-CCNC: 114 U/L — SIGNIFICANT CHANGE UP (ref 30–115)
ALT FLD-CCNC: 6 U/L — SIGNIFICANT CHANGE UP (ref 0–41)
ANION GAP SERPL CALC-SCNC: 8 MMOL/L — SIGNIFICANT CHANGE UP (ref 7–14)
APPEARANCE UR: ABNORMAL
APTT BLD: 30.3 SEC — SIGNIFICANT CHANGE UP (ref 27–39.2)
AST SERPL-CCNC: 12 U/L — SIGNIFICANT CHANGE UP (ref 0–41)
BACTERIA # UR AUTO: ABNORMAL /HPF
BASE EXCESS BLDV CALC-SCNC: 7.9 MMOL/L — HIGH (ref -2–3)
BASOPHILS # BLD AUTO: 0.04 K/UL — SIGNIFICANT CHANGE UP (ref 0–0.2)
BASOPHILS NFR BLD AUTO: 0.3 % — SIGNIFICANT CHANGE UP (ref 0–1)
BILIRUB SERPL-MCNC: 0.3 MG/DL — SIGNIFICANT CHANGE UP (ref 0.2–1.2)
BILIRUB UR-MCNC: NEGATIVE — SIGNIFICANT CHANGE UP
BUN SERPL-MCNC: 19 MG/DL — SIGNIFICANT CHANGE UP (ref 10–20)
CA-I SERPL-SCNC: 1.2 MMOL/L — SIGNIFICANT CHANGE UP (ref 1.15–1.33)
CALCIUM SERPL-MCNC: 9 MG/DL — SIGNIFICANT CHANGE UP (ref 8.4–10.5)
CHLORIDE SERPL-SCNC: 99 MMOL/L — SIGNIFICANT CHANGE UP (ref 98–110)
CO2 SERPL-SCNC: 30 MMOL/L — SIGNIFICANT CHANGE UP (ref 17–32)
COLOR SPEC: YELLOW — SIGNIFICANT CHANGE UP
CREAT SERPL-MCNC: 0.6 MG/DL — LOW (ref 0.7–1.5)
DIFF PNL FLD: NEGATIVE — SIGNIFICANT CHANGE UP
EGFR: 93 ML/MIN/1.73M2 — SIGNIFICANT CHANGE UP
EGFR: 93 ML/MIN/1.73M2 — SIGNIFICANT CHANGE UP
EOSINOPHIL # BLD AUTO: 0.42 K/UL — SIGNIFICANT CHANGE UP (ref 0–0.7)
EOSINOPHIL NFR BLD AUTO: 3.3 % — SIGNIFICANT CHANGE UP (ref 0–8)
EPI CELLS # UR: PRESENT
FLUAV AG NPH QL: SIGNIFICANT CHANGE UP
FLUBV AG NPH QL: SIGNIFICANT CHANGE UP
GAS PNL BLDA: SIGNIFICANT CHANGE UP
GAS PNL BLDV: 135 MMOL/L — LOW (ref 136–145)
GAS PNL BLDV: SIGNIFICANT CHANGE UP
GLUCOSE SERPL-MCNC: 151 MG/DL — HIGH (ref 70–99)
GLUCOSE UR QL: NEGATIVE MG/DL — SIGNIFICANT CHANGE UP
HCO3 BLDV-SCNC: 37 MMOL/L — HIGH (ref 22–29)
HCT VFR BLD CALC: 40.2 % — SIGNIFICANT CHANGE UP (ref 37–47)
HCT VFR BLDA CALC: 37 % — SIGNIFICANT CHANGE UP (ref 34.5–46.5)
HGB BLD CALC-MCNC: 12.3 G/DL — SIGNIFICANT CHANGE UP (ref 11.7–16.1)
HGB BLD-MCNC: 12.2 G/DL — SIGNIFICANT CHANGE UP (ref 12–16)
IMM GRANULOCYTES NFR BLD AUTO: 0.3 % — SIGNIFICANT CHANGE UP (ref 0.1–0.3)
INR BLD: 0.98 RATIO — SIGNIFICANT CHANGE UP (ref 0.65–1.3)
KETONES UR-MCNC: NEGATIVE MG/DL — SIGNIFICANT CHANGE UP
LACTATE BLDV-MCNC: 1.5 MMOL/L — SIGNIFICANT CHANGE UP (ref 0.5–2)
LACTATE SERPL-SCNC: 1.5 MMOL/L — SIGNIFICANT CHANGE UP (ref 0.7–2)
LEUKOCYTE ESTERASE UR-ACNC: NEGATIVE — SIGNIFICANT CHANGE UP
LYMPHOCYTES # BLD AUTO: 1.12 K/UL — LOW (ref 1.2–3.4)
LYMPHOCYTES # BLD AUTO: 8.9 % — LOW (ref 20.5–51.1)
MCHC RBC-ENTMCNC: 26.9 PG — LOW (ref 27–31)
MCHC RBC-ENTMCNC: 30.3 G/DL — LOW (ref 32–37)
MCV RBC AUTO: 88.7 FL — SIGNIFICANT CHANGE UP (ref 81–99)
MONOCYTES # BLD AUTO: 0.47 K/UL — SIGNIFICANT CHANGE UP (ref 0.1–0.6)
MONOCYTES NFR BLD AUTO: 3.7 % — SIGNIFICANT CHANGE UP (ref 1.7–9.3)
NEUTROPHILS # BLD AUTO: 10.52 K/UL — HIGH (ref 1.4–6.5)
NEUTROPHILS NFR BLD AUTO: 83.5 % — HIGH (ref 42.2–75.2)
NITRITE UR-MCNC: NEGATIVE — SIGNIFICANT CHANGE UP
NRBC # BLD: 0 /100 WBCS — SIGNIFICANT CHANGE UP (ref 0–0)
NRBC BLD-RTO: 0 /100 WBCS — SIGNIFICANT CHANGE UP (ref 0–0)
PCO2 BLDV: 75 MMHG — CRITICAL HIGH (ref 39–42)
PH BLDV: 7.3 — LOW (ref 7.32–7.43)
PH UR: 5.5 — SIGNIFICANT CHANGE UP (ref 5–8)
PLATELET # BLD AUTO: 271 K/UL — SIGNIFICANT CHANGE UP (ref 130–400)
PMV BLD: 9.5 FL — SIGNIFICANT CHANGE UP (ref 7.4–10.4)
PO2 BLDV: 46 MMHG — HIGH (ref 25–45)
POTASSIUM BLDV-SCNC: 4.7 MMOL/L — SIGNIFICANT CHANGE UP (ref 3.5–5.1)
POTASSIUM SERPL-MCNC: 4.7 MMOL/L — SIGNIFICANT CHANGE UP (ref 3.5–5)
POTASSIUM SERPL-SCNC: 4.7 MMOL/L — SIGNIFICANT CHANGE UP (ref 3.5–5)
PROT SERPL-MCNC: 6.5 G/DL — SIGNIFICANT CHANGE UP (ref 6–8)
PROT UR-MCNC: 100 MG/DL
PROTHROM AB SERPL-ACNC: 11.6 SEC — SIGNIFICANT CHANGE UP (ref 9.95–12.87)
RBC # BLD: 4.53 M/UL — SIGNIFICANT CHANGE UP (ref 4.2–5.4)
RBC # FLD: 14.9 % — HIGH (ref 11.5–14.5)
RBC CASTS # UR COMP ASSIST: 2 /HPF — SIGNIFICANT CHANGE UP (ref 0–4)
RSV RNA NPH QL NAA+NON-PROBE: SIGNIFICANT CHANGE UP
SAO2 % BLDV: 78.7 % — SIGNIFICANT CHANGE UP (ref 67–88)
SARS-COV-2 RNA SPEC QL NAA+PROBE: SIGNIFICANT CHANGE UP
SODIUM SERPL-SCNC: 137 MMOL/L — SIGNIFICANT CHANGE UP (ref 135–146)
SP GR SPEC: 1.03 — SIGNIFICANT CHANGE UP (ref 1–1.03)
SQUAMOUS # UR AUTO: 10 /HPF — HIGH (ref 0–5)
UROBILINOGEN FLD QL: 1 MG/DL — SIGNIFICANT CHANGE UP (ref 0.2–1)
WBC # BLD: 12.61 K/UL — HIGH (ref 4.8–10.8)
WBC # FLD AUTO: 12.61 K/UL — HIGH (ref 4.8–10.8)
WBC UR QL: 3 /HPF — SIGNIFICANT CHANGE UP (ref 0–5)

## 2024-11-29 PROCEDURE — 80053 COMPREHEN METABOLIC PANEL: CPT

## 2024-11-29 PROCEDURE — 94640 AIRWAY INHALATION TREATMENT: CPT

## 2024-11-29 PROCEDURE — 84145 PROCALCITONIN (PCT): CPT

## 2024-11-29 PROCEDURE — 84132 ASSAY OF SERUM POTASSIUM: CPT

## 2024-11-29 PROCEDURE — 83735 ASSAY OF MAGNESIUM: CPT

## 2024-11-29 PROCEDURE — 71045 X-RAY EXAM CHEST 1 VIEW: CPT | Mod: 26

## 2024-11-29 PROCEDURE — 85018 HEMOGLOBIN: CPT

## 2024-11-29 PROCEDURE — 36415 COLL VENOUS BLD VENIPUNCTURE: CPT

## 2024-11-29 PROCEDURE — 84295 ASSAY OF SERUM SODIUM: CPT

## 2024-11-29 PROCEDURE — 85014 HEMATOCRIT: CPT

## 2024-11-29 PROCEDURE — 85027 COMPLETE CBC AUTOMATED: CPT

## 2024-11-29 PROCEDURE — 82803 BLOOD GASES ANY COMBINATION: CPT

## 2024-11-29 PROCEDURE — 94660 CPAP INITIATION&MGMT: CPT

## 2024-11-29 PROCEDURE — 84100 ASSAY OF PHOSPHORUS: CPT

## 2024-11-29 PROCEDURE — 99291 CRITICAL CARE FIRST HOUR: CPT

## 2024-11-29 PROCEDURE — 99223 1ST HOSP IP/OBS HIGH 75: CPT

## 2024-11-29 PROCEDURE — 82330 ASSAY OF CALCIUM: CPT

## 2024-11-29 PROCEDURE — 93010 ELECTROCARDIOGRAM REPORT: CPT

## 2024-11-29 PROCEDURE — 83605 ASSAY OF LACTIC ACID: CPT

## 2024-11-29 RX ORDER — ATORVASTATIN CALCIUM 80 MG/1
40 TABLET, FILM COATED ORAL AT BEDTIME
Refills: 0 | Status: DISCONTINUED | OUTPATIENT
Start: 2024-11-29 | End: 2024-11-30

## 2024-11-29 RX ORDER — ALBUTEROL SULFATE 2.5 MG/3ML
2 VIAL, NEBULIZER (ML) INHALATION EVERY 6 HOURS
Refills: 0 | Status: DISCONTINUED | OUTPATIENT
Start: 2024-11-29 | End: 2024-11-30

## 2024-11-29 RX ORDER — SODIUM CHLORIDE 9 G/1000ML
1000 INJECTION, SOLUTION INTRAVENOUS
Refills: 0 | Status: DISCONTINUED | OUTPATIENT
Start: 2024-11-29 | End: 2024-11-30

## 2024-11-29 RX ORDER — ACETAMINOPHEN 500 MG/5ML
975 LIQUID (ML) ORAL ONCE
Refills: 0 | Status: COMPLETED | OUTPATIENT
Start: 2024-11-29 | End: 2024-11-29

## 2024-11-29 RX ORDER — ASPIRIN 325 MG
81 TABLET ORAL DAILY
Refills: 0 | Status: DISCONTINUED | OUTPATIENT
Start: 2024-11-29 | End: 2024-11-30

## 2024-11-29 RX ORDER — LISINOPRIL 5 MG/1
40 TABLET ORAL DAILY
Refills: 0 | Status: DISCONTINUED | OUTPATIENT
Start: 2024-11-29 | End: 2024-11-30

## 2024-11-29 RX ORDER — METHYLPREDNISOLONE ACETATE 80 MG/ML
40 INJECTION, SUSPENSION INTRA-ARTICULAR; INTRALESIONAL; INTRAMUSCULAR; SOFT TISSUE EVERY 8 HOURS
Refills: 0 | Status: DISCONTINUED | OUTPATIENT
Start: 2024-11-29 | End: 2024-11-30

## 2024-11-29 RX ORDER — AZITHROMYCIN 250 MG
250 CAPSULE ORAL DAILY
Refills: 0 | Status: DISCONTINUED | OUTPATIENT
Start: 2024-11-30 | End: 2024-11-30

## 2024-11-29 RX ORDER — METHYLPREDNISOLONE ACETATE 80 MG/ML
125 INJECTION, SUSPENSION INTRA-ARTICULAR; INTRALESIONAL; INTRAMUSCULAR; SOFT TISSUE ONCE
Refills: 0 | Status: DISCONTINUED | OUTPATIENT
Start: 2024-11-29 | End: 2024-11-29

## 2024-11-29 RX ORDER — CEFEPIME 2 G/20ML
2000 INJECTION, POWDER, FOR SOLUTION INTRAVENOUS ONCE
Refills: 0 | Status: COMPLETED | OUTPATIENT
Start: 2024-11-29 | End: 2024-11-29

## 2024-11-29 RX ORDER — AZITHROMYCIN 250 MG
500 CAPSULE ORAL ONCE
Refills: 0 | Status: DISCONTINUED | OUTPATIENT
Start: 2024-11-29 | End: 2024-11-30

## 2024-11-29 RX ORDER — MAGNESIUM SULFATE 500 MG/ML
2 SYRINGE (ML) INJECTION ONCE
Refills: 0 | Status: DISCONTINUED | OUTPATIENT
Start: 2024-11-29 | End: 2024-11-29

## 2024-11-29 RX ORDER — VANCOMYCIN HCL IN 5 % DEXTROSE 1.5G/250ML
1000 PLASTIC BAG, INJECTION (ML) INTRAVENOUS ONCE
Refills: 0 | Status: COMPLETED | OUTPATIENT
Start: 2024-11-29 | End: 2024-11-29

## 2024-11-29 RX ORDER — IPRATROPIUM BROMIDE AND ALBUTEROL SULFATE .5; 2.5 MG/3ML; MG/3ML
3 SOLUTION RESPIRATORY (INHALATION) ONCE
Refills: 0 | Status: COMPLETED | OUTPATIENT
Start: 2024-11-29 | End: 2024-11-29

## 2024-11-29 RX ADMIN — Medication 250 MILLIGRAM(S): at 11:05

## 2024-11-29 RX ADMIN — Medication 1750 MILLILITER(S): at 11:01

## 2024-11-29 RX ADMIN — CEFEPIME 100 MILLIGRAM(S): 2 INJECTION, POWDER, FOR SOLUTION INTRAVENOUS at 10:02

## 2024-11-29 RX ADMIN — Medication 1750 MILLILITER(S): at 10:01

## 2024-11-29 RX ADMIN — Medication 975 MILLIGRAM(S): at 10:01

## 2024-11-29 RX ADMIN — SODIUM CHLORIDE 50 MILLILITER(S): 9 INJECTION, SOLUTION INTRAVENOUS at 19:49

## 2024-11-29 RX ADMIN — METHYLPREDNISOLONE ACETATE 40 MILLIGRAM(S): 80 INJECTION, SUSPENSION INTRA-ARTICULAR; INTRALESIONAL; INTRAMUSCULAR; SOFT TISSUE at 21:40

## 2024-11-29 RX ADMIN — METHYLPREDNISOLONE ACETATE 40 MILLIGRAM(S): 80 INJECTION, SUSPENSION INTRA-ARTICULAR; INTRALESIONAL; INTRAMUSCULAR; SOFT TISSUE at 14:24

## 2024-11-30 ENCOUNTER — TRANSCRIPTION ENCOUNTER (OUTPATIENT)
Age: 74
End: 2024-11-30

## 2024-11-30 VITALS — RESPIRATION RATE: 20 BRPM | TEMPERATURE: 98 F

## 2024-11-30 LAB
ALBUMIN SERPL ELPH-MCNC: 3.6 G/DL — SIGNIFICANT CHANGE UP (ref 3.5–5.2)
ALP SERPL-CCNC: 94 U/L — SIGNIFICANT CHANGE UP (ref 30–115)
ALT FLD-CCNC: 5 U/L — SIGNIFICANT CHANGE UP (ref 0–41)
ANION GAP SERPL CALC-SCNC: 9 MMOL/L — SIGNIFICANT CHANGE UP (ref 7–14)
AST SERPL-CCNC: 10 U/L — SIGNIFICANT CHANGE UP (ref 0–41)
BILIRUB SERPL-MCNC: 0.3 MG/DL — SIGNIFICANT CHANGE UP (ref 0.2–1.2)
BUN SERPL-MCNC: 20 MG/DL — SIGNIFICANT CHANGE UP (ref 10–20)
CALCIUM SERPL-MCNC: 8.8 MG/DL — SIGNIFICANT CHANGE UP (ref 8.4–10.5)
CHLORIDE SERPL-SCNC: 104 MMOL/L — SIGNIFICANT CHANGE UP (ref 98–110)
CO2 SERPL-SCNC: 26 MMOL/L — SIGNIFICANT CHANGE UP (ref 17–32)
CREAT SERPL-MCNC: 0.5 MG/DL — LOW (ref 0.7–1.5)
EGFR: 97 ML/MIN/1.73M2 — SIGNIFICANT CHANGE UP
EGFR: 97 ML/MIN/1.73M2 — SIGNIFICANT CHANGE UP
GAS PNL BLDA: SIGNIFICANT CHANGE UP
GAS PNL BLDA: SIGNIFICANT CHANGE UP
GLUCOSE SERPL-MCNC: 113 MG/DL — HIGH (ref 70–99)
HCT VFR BLD CALC: 36.3 % — LOW (ref 37–47)
HGB BLD-MCNC: 11.1 G/DL — LOW (ref 12–16)
MAGNESIUM SERPL-MCNC: 2.1 MG/DL — SIGNIFICANT CHANGE UP (ref 1.8–2.4)
MCHC RBC-ENTMCNC: 26.9 PG — LOW (ref 27–31)
MCHC RBC-ENTMCNC: 30.6 G/DL — LOW (ref 32–37)
MCV RBC AUTO: 88.1 FL — SIGNIFICANT CHANGE UP (ref 81–99)
NRBC # BLD: 0 /100 WBCS — SIGNIFICANT CHANGE UP (ref 0–0)
NRBC BLD-RTO: 0 /100 WBCS — SIGNIFICANT CHANGE UP (ref 0–0)
PHOSPHATE SERPL-MCNC: 3.7 MG/DL — SIGNIFICANT CHANGE UP (ref 2.1–4.9)
PLATELET # BLD AUTO: 228 K/UL — SIGNIFICANT CHANGE UP (ref 130–400)
PMV BLD: 9.7 FL — SIGNIFICANT CHANGE UP (ref 7.4–10.4)
POTASSIUM SERPL-MCNC: 5.1 MMOL/L — HIGH (ref 3.5–5)
POTASSIUM SERPL-SCNC: 5.1 MMOL/L — HIGH (ref 3.5–5)
PROCALCITONIN SERPL-MCNC: 0.03 NG/ML — SIGNIFICANT CHANGE UP (ref 0.02–0.1)
PROT SERPL-MCNC: 5.7 G/DL — LOW (ref 6–8)
RBC # BLD: 4.12 M/UL — LOW (ref 4.2–5.4)
RBC # FLD: 14.8 % — HIGH (ref 11.5–14.5)
SODIUM SERPL-SCNC: 139 MMOL/L — SIGNIFICANT CHANGE UP (ref 135–146)
WBC # BLD: 9.08 K/UL — SIGNIFICANT CHANGE UP (ref 4.8–10.8)
WBC # FLD AUTO: 9.08 K/UL — SIGNIFICANT CHANGE UP (ref 4.8–10.8)

## 2024-11-30 PROCEDURE — 99239 HOSP IP/OBS DSCHRG MGMT >30: CPT

## 2024-11-30 PROCEDURE — 99233 SBSQ HOSP IP/OBS HIGH 50: CPT

## 2024-11-30 RX ORDER — IPRATROPIUM BROMIDE AND ALBUTEROL SULFATE .5; 2.5 MG/3ML; MG/3ML
3 SOLUTION RESPIRATORY (INHALATION)
Qty: 360 | Refills: 0
Start: 2024-11-30 | End: 2024-12-29

## 2024-11-30 RX ORDER — AZITHROMYCIN 250 MG
1 CAPSULE ORAL
Qty: 4 | Refills: 0
Start: 2024-11-30 | End: 2024-12-03

## 2024-11-30 RX ORDER — DEXTROMETHORPHAN HBR, GUAIFENESIN 20; 200 MG/10ML; MG/10ML
10 SOLUTION ORAL
Qty: 1 | Refills: 0
Start: 2024-11-30 | End: 2024-12-03

## 2024-11-30 RX ORDER — AZITHROMYCIN 250 MG
250 CAPSULE ORAL ONCE
Refills: 0 | Status: COMPLETED | OUTPATIENT
Start: 2024-11-30 | End: 2024-11-30

## 2024-11-30 RX ORDER — DEXTROMETHORPHAN HBR, GUAIFENESIN 20; 200 MG/10ML; MG/10ML
10 SOLUTION ORAL ONCE
Refills: 0 | Status: COMPLETED | OUTPATIENT
Start: 2024-11-30 | End: 2024-11-30

## 2024-11-30 RX ORDER — PREDNISONE 20 MG/1
2 TABLET ORAL
Qty: 8 | Refills: 0
Start: 2024-11-30 | End: 2024-12-03

## 2024-11-30 RX ORDER — ENOXAPARIN SODIUM 100 MG/ML
40 INJECTION SUBCUTANEOUS EVERY 24 HOURS
Refills: 0 | Status: DISCONTINUED | OUTPATIENT
Start: 2024-11-30 | End: 2024-11-30

## 2024-11-30 RX ORDER — IPRATROPIUM BROMIDE AND ALBUTEROL SULFATE .5; 2.5 MG/3ML; MG/3ML
3 SOLUTION RESPIRATORY (INHALATION) ONCE
Refills: 0 | Status: COMPLETED | OUTPATIENT
Start: 2024-11-30 | End: 2024-11-30

## 2024-11-30 RX ORDER — BENZONATATE 100 MG
1 CAPSULE ORAL
Qty: 15 | Refills: 0
Start: 2024-11-30 | End: 2024-12-04

## 2024-11-30 RX ORDER — BENZONATATE 100 MG
200 CAPSULE ORAL ONCE
Refills: 0 | Status: COMPLETED | OUTPATIENT
Start: 2024-11-30 | End: 2024-11-30

## 2024-11-30 RX ADMIN — Medication 200 MILLIGRAM(S): at 11:05

## 2024-11-30 RX ADMIN — METHYLPREDNISOLONE ACETATE 40 MILLIGRAM(S): 80 INJECTION, SUSPENSION INTRA-ARTICULAR; INTRALESIONAL; INTRAMUSCULAR; SOFT TISSUE at 13:09

## 2024-11-30 RX ADMIN — SODIUM CHLORIDE 50 MILLILITER(S): 9 INJECTION, SOLUTION INTRAVENOUS at 15:47

## 2024-11-30 RX ADMIN — Medication 81 MILLIGRAM(S): at 11:05

## 2024-11-30 RX ADMIN — ENOXAPARIN SODIUM 40 MILLIGRAM(S): 100 INJECTION SUBCUTANEOUS at 15:52

## 2024-11-30 RX ADMIN — Medication 40 MILLIGRAM(S): at 15:52

## 2024-11-30 RX ADMIN — Medication 250 MILLIGRAM(S): at 15:49

## 2024-11-30 RX ADMIN — DEXTROMETHORPHAN HBR, GUAIFENESIN 10 MILLILITER(S): 20; 200 SOLUTION ORAL at 11:04

## 2024-11-30 RX ADMIN — IPRATROPIUM BROMIDE AND ALBUTEROL SULFATE 3 MILLILITER(S): .5; 2.5 SOLUTION RESPIRATORY (INHALATION) at 13:17

## 2024-11-30 RX ADMIN — Medication 250 MILLIGRAM(S): at 11:05

## 2024-11-30 RX ADMIN — METHYLPREDNISOLONE ACETATE 40 MILLIGRAM(S): 80 INJECTION, SUSPENSION INTRA-ARTICULAR; INTRALESIONAL; INTRAMUSCULAR; SOFT TISSUE at 05:13

## 2024-12-05 DIAGNOSIS — Z86.16 PERSONAL HISTORY OF COVID-19: ICD-10-CM

## 2024-12-05 DIAGNOSIS — Z53.21 PROCEDURE AND TREATMENT NOT CARRIED OUT DUE TO PATIENT LEAVING PRIOR TO BEING SEEN BY HEALTH CARE PROVIDER: ICD-10-CM

## 2024-12-05 DIAGNOSIS — K21.9 GASTRO-ESOPHAGEAL REFLUX DISEASE WITHOUT ESOPHAGITIS: ICD-10-CM

## 2024-12-05 DIAGNOSIS — Z99.81 DEPENDENCE ON SUPPLEMENTAL OXYGEN: ICD-10-CM

## 2024-12-05 DIAGNOSIS — J96.02 ACUTE RESPIRATORY FAILURE WITH HYPERCAPNIA: ICD-10-CM

## 2024-12-05 DIAGNOSIS — Z87.891 PERSONAL HISTORY OF NICOTINE DEPENDENCE: ICD-10-CM

## 2024-12-05 DIAGNOSIS — Z88.8 ALLERGY STATUS TO OTHER DRUGS, MEDICAMENTS AND BIOLOGICAL SUBSTANCES: ICD-10-CM

## 2024-12-05 DIAGNOSIS — Z79.82 LONG TERM (CURRENT) USE OF ASPIRIN: ICD-10-CM

## 2024-12-05 DIAGNOSIS — I10 ESSENTIAL (PRIMARY) HYPERTENSION: ICD-10-CM

## 2024-12-05 DIAGNOSIS — Z88.0 ALLERGY STATUS TO PENICILLIN: ICD-10-CM

## 2024-12-05 DIAGNOSIS — J44.1 CHRONIC OBSTRUCTIVE PULMONARY DISEASE WITH (ACUTE) EXACERBATION: ICD-10-CM

## 2024-12-05 DIAGNOSIS — E78.5 HYPERLIPIDEMIA, UNSPECIFIED: ICD-10-CM
